# Patient Record
Sex: MALE | Race: WHITE | NOT HISPANIC OR LATINO | Employment: FULL TIME
[De-identification: names, ages, dates, MRNs, and addresses within clinical notes are randomized per-mention and may not be internally consistent; named-entity substitution may affect disease eponyms.]

---

## 2017-01-18 ENCOUNTER — RECORDS - HEALTHEAST (OUTPATIENT)
Dept: ADMINISTRATIVE | Facility: OTHER | Age: 52
End: 2017-01-18

## 2017-02-03 ENCOUNTER — COMMUNICATION - HEALTHEAST (OUTPATIENT)
Dept: INTERNAL MEDICINE | Facility: CLINIC | Age: 52
End: 2017-02-03

## 2017-03-21 ENCOUNTER — COMMUNICATION - HEALTHEAST (OUTPATIENT)
Dept: INTERNAL MEDICINE | Facility: CLINIC | Age: 52
End: 2017-03-21

## 2017-05-03 ENCOUNTER — RECORDS - HEALTHEAST (OUTPATIENT)
Dept: ADMINISTRATIVE | Facility: OTHER | Age: 52
End: 2017-05-03

## 2017-07-20 ENCOUNTER — COMMUNICATION - HEALTHEAST (OUTPATIENT)
Dept: INTERNAL MEDICINE | Facility: CLINIC | Age: 52
End: 2017-07-20

## 2017-08-02 ENCOUNTER — RECORDS - HEALTHEAST (OUTPATIENT)
Dept: ADMINISTRATIVE | Facility: OTHER | Age: 52
End: 2017-08-02

## 2018-02-09 ENCOUNTER — COMMUNICATION - HEALTHEAST (OUTPATIENT)
Dept: INTERNAL MEDICINE | Facility: CLINIC | Age: 53
End: 2018-02-09

## 2018-08-01 ENCOUNTER — COMMUNICATION - HEALTHEAST (OUTPATIENT)
Dept: INTERNAL MEDICINE | Facility: CLINIC | Age: 53
End: 2018-08-01

## 2018-08-31 ENCOUNTER — OFFICE VISIT - HEALTHEAST (OUTPATIENT)
Dept: INTERNAL MEDICINE | Facility: CLINIC | Age: 53
End: 2018-08-31

## 2018-08-31 DIAGNOSIS — L03.90 CELLULITIS: ICD-10-CM

## 2018-08-31 DIAGNOSIS — Z12.11 SCREEN FOR COLON CANCER: ICD-10-CM

## 2018-08-31 DIAGNOSIS — M06.9 RA (RHEUMATOID ARTHRITIS) (H): ICD-10-CM

## 2018-08-31 LAB
ALBUMIN SERPL-MCNC: 4.5 G/DL (ref 3.5–5)
ALBUMIN SERPL-MCNC: 4.5 G/DL (ref 3.5–5)
ALP SERPL-CCNC: 64 U/L (ref 45–120)
ALT SERPL W P-5'-P-CCNC: 33 U/L (ref 0–45)
ANION GAP SERPL CALCULATED.3IONS-SCNC: 11 MMOL/L (ref 5–18)
AST SERPL W P-5'-P-CCNC: 33 U/L (ref 0–40)
BASOPHILS # BLD AUTO: 0.1 THOU/UL (ref 0–0.2)
BASOPHILS NFR BLD AUTO: 1 % (ref 0–2)
BILIRUB DIRECT SERPL-MCNC: 0.3 MG/DL
BILIRUB SERPL-MCNC: 0.9 MG/DL (ref 0–1)
BUN SERPL-MCNC: 19 MG/DL (ref 8–22)
CALCIUM SERPL-MCNC: 9.9 MG/DL (ref 8.5–10.5)
CHLORIDE BLD-SCNC: 101 MMOL/L (ref 98–107)
CO2 SERPL-SCNC: 27 MMOL/L (ref 22–31)
CREAT SERPL-MCNC: 0.94 MG/DL (ref 0.7–1.3)
EOSINOPHIL # BLD AUTO: 0.3 THOU/UL (ref 0–0.4)
EOSINOPHIL NFR BLD AUTO: 3 % (ref 0–6)
ERYTHROCYTE [DISTWIDTH] IN BLOOD BY AUTOMATED COUNT: 11.3 % (ref 11–14.5)
ERYTHROCYTE [SEDIMENTATION RATE] IN BLOOD BY WESTERGREN METHOD: 2 MM/HR (ref 0–15)
GFR SERPL CREATININE-BSD FRML MDRD: >60 ML/MIN/1.73M2
GLUCOSE BLD-MCNC: 98 MG/DL (ref 70–125)
HCT VFR BLD AUTO: 44.2 % (ref 40–54)
HGB BLD-MCNC: 14.9 G/DL (ref 14–18)
LYMPHOCYTES # BLD AUTO: 1.3 THOU/UL (ref 0.8–4.4)
LYMPHOCYTES NFR BLD AUTO: 13 % (ref 20–40)
MCH RBC QN AUTO: 30.8 PG (ref 27–34)
MCHC RBC AUTO-ENTMCNC: 33.8 G/DL (ref 32–36)
MCV RBC AUTO: 91 FL (ref 80–100)
MONOCYTES # BLD AUTO: 0.9 THOU/UL (ref 0–0.9)
MONOCYTES NFR BLD AUTO: 9 % (ref 2–10)
NEUTROPHILS # BLD AUTO: 7.3 THOU/UL (ref 2–7.7)
NEUTROPHILS NFR BLD AUTO: 74 % (ref 50–70)
PHOSPHATE SERPL-MCNC: 3.1 MG/DL (ref 2.5–4.5)
PLATELET # BLD AUTO: 313 THOU/UL (ref 140–440)
PMV BLD AUTO: 7 FL (ref 7–10)
POTASSIUM BLD-SCNC: 4.9 MMOL/L (ref 3.5–5)
PROT SERPL-MCNC: 7.3 G/DL (ref 6–8)
RBC # BLD AUTO: 4.84 MILL/UL (ref 4.4–6.2)
SODIUM SERPL-SCNC: 139 MMOL/L (ref 136–145)
URATE SERPL-MCNC: 8.4 MG/DL (ref 3–8)
WBC: 9.8 THOU/UL (ref 4–11)

## 2018-08-31 ASSESSMENT — MIFFLIN-ST. JEOR: SCORE: 1665.3

## 2018-09-07 ENCOUNTER — COMMUNICATION - HEALTHEAST (OUTPATIENT)
Dept: INTERNAL MEDICINE | Facility: CLINIC | Age: 53
End: 2018-09-07

## 2019-01-05 ENCOUNTER — COMMUNICATION - HEALTHEAST (OUTPATIENT)
Dept: INTERNAL MEDICINE | Facility: CLINIC | Age: 54
End: 2019-01-05

## 2019-01-05 DIAGNOSIS — I10 HTN (HYPERTENSION): ICD-10-CM

## 2019-05-03 ENCOUNTER — RECORDS - HEALTHEAST (OUTPATIENT)
Dept: ADMINISTRATIVE | Facility: OTHER | Age: 54
End: 2019-05-03

## 2019-05-10 ENCOUNTER — OFFICE VISIT - HEALTHEAST (OUTPATIENT)
Dept: INTERNAL MEDICINE | Facility: CLINIC | Age: 54
End: 2019-05-10

## 2019-05-10 DIAGNOSIS — N50.3 EPIDIDYMAL CYST: ICD-10-CM

## 2019-05-10 DIAGNOSIS — Z00.00 PREVENTATIVE HEALTH CARE: ICD-10-CM

## 2019-05-10 DIAGNOSIS — M05.722 RHEUMATOID ARTHRITIS INVOLVING BOTH ELBOWS WITH POSITIVE RHEUMATOID FACTOR (H): ICD-10-CM

## 2019-05-10 DIAGNOSIS — I10 ESSENTIAL HYPERTENSION: ICD-10-CM

## 2019-05-10 DIAGNOSIS — M05.721 RHEUMATOID ARTHRITIS INVOLVING BOTH ELBOWS WITH POSITIVE RHEUMATOID FACTOR (H): ICD-10-CM

## 2019-05-10 LAB
ALBUMIN SERPL-MCNC: 4.6 G/DL (ref 3.5–5)
ALBUMIN UR-MCNC: NEGATIVE MG/DL
ALP SERPL-CCNC: 57 U/L (ref 45–120)
ALT SERPL W P-5'-P-CCNC: 28 U/L (ref 0–45)
ANION GAP SERPL CALCULATED.3IONS-SCNC: 12 MMOL/L (ref 5–18)
APPEARANCE UR: CLEAR
AST SERPL W P-5'-P-CCNC: 30 U/L (ref 0–40)
BASOPHILS # BLD AUTO: 0.1 THOU/UL (ref 0–0.2)
BASOPHILS NFR BLD AUTO: 1 % (ref 0–2)
BILIRUB DIRECT SERPL-MCNC: 0.4 MG/DL
BILIRUB SERPL-MCNC: 1 MG/DL (ref 0–1)
BILIRUB UR QL STRIP: NEGATIVE
BUN SERPL-MCNC: 13 MG/DL (ref 8–22)
C REACTIVE PROTEIN LHE: <0.1 MG/DL (ref 0–0.8)
CALCIUM SERPL-MCNC: 9 MG/DL (ref 8.5–10.5)
CHLORIDE BLD-SCNC: 100 MMOL/L (ref 98–107)
CHOLEST SERPL-MCNC: 204 MG/DL
CO2 SERPL-SCNC: 26 MMOL/L (ref 22–31)
COLOR UR AUTO: YELLOW
CREAT SERPL-MCNC: 0.92 MG/DL (ref 0.7–1.3)
EOSINOPHIL # BLD AUTO: 0.2 THOU/UL (ref 0–0.4)
EOSINOPHIL NFR BLD AUTO: 2 % (ref 0–6)
ERYTHROCYTE [DISTWIDTH] IN BLOOD BY AUTOMATED COUNT: 11.5 % (ref 11–14.5)
ERYTHROCYTE [SEDIMENTATION RATE] IN BLOOD BY WESTERGREN METHOD: 2 MM/HR (ref 0–15)
FASTING STATUS PATIENT QL REPORTED: YES
GFR SERPL CREATININE-BSD FRML MDRD: >60 ML/MIN/1.73M2
GLUCOSE BLD-MCNC: 95 MG/DL (ref 70–125)
GLUCOSE UR STRIP-MCNC: NEGATIVE MG/DL
HCT VFR BLD AUTO: 42.2 % (ref 40–54)
HDLC SERPL-MCNC: 97 MG/DL
HGB BLD-MCNC: 14.4 G/DL (ref 14–18)
HGB UR QL STRIP: NEGATIVE
KETONES UR STRIP-MCNC: NEGATIVE MG/DL
LDLC SERPL CALC-MCNC: 96 MG/DL
LEUKOCYTE ESTERASE UR QL STRIP: NEGATIVE
LYMPHOCYTES # BLD AUTO: 1.7 THOU/UL (ref 0.8–4.4)
LYMPHOCYTES NFR BLD AUTO: 21 % (ref 20–40)
MCH RBC QN AUTO: 31 PG (ref 27–34)
MCHC RBC AUTO-ENTMCNC: 34.2 G/DL (ref 32–36)
MCV RBC AUTO: 91 FL (ref 80–100)
MONOCYTES # BLD AUTO: 0.7 THOU/UL (ref 0–0.9)
MONOCYTES NFR BLD AUTO: 9 % (ref 2–10)
NEUTROPHILS # BLD AUTO: 5.5 THOU/UL (ref 2–7.7)
NEUTROPHILS NFR BLD AUTO: 68 % (ref 50–70)
NITRATE UR QL: NEGATIVE
PH UR STRIP: 5.5 [PH] (ref 5–8)
PLATELET # BLD AUTO: 328 THOU/UL (ref 140–440)
PMV BLD AUTO: 7 FL (ref 7–10)
POTASSIUM BLD-SCNC: 3.4 MMOL/L (ref 3.5–5)
PROT SERPL-MCNC: 7.1 G/DL (ref 6–8)
PSA SERPL-MCNC: 1.6 NG/ML (ref 0–3.5)
RBC # BLD AUTO: 4.65 MILL/UL (ref 4.4–6.2)
SODIUM SERPL-SCNC: 138 MMOL/L (ref 136–145)
SP GR UR STRIP: <=1.005 (ref 1–1.03)
TRIGL SERPL-MCNC: 55 MG/DL
TSH SERPL DL<=0.005 MIU/L-ACNC: 0.68 UIU/ML (ref 0.3–5)
UROBILINOGEN UR STRIP-ACNC: NORMAL
WBC: 8.2 THOU/UL (ref 4–11)

## 2019-05-10 ASSESSMENT — MIFFLIN-ST. JEOR: SCORE: 1684.01

## 2019-05-11 ENCOUNTER — COMMUNICATION - HEALTHEAST (OUTPATIENT)
Dept: INTERNAL MEDICINE | Facility: CLINIC | Age: 54
End: 2019-05-11

## 2019-05-15 LAB
ATRIAL RATE - MUSE: 63 BPM
DIASTOLIC BLOOD PRESSURE - MUSE: NORMAL MMHG
INTERPRETATION ECG - MUSE: NORMAL
P AXIS - MUSE: 67 DEGREES
PR INTERVAL - MUSE: 190 MS
QRS DURATION - MUSE: 106 MS
QT - MUSE: 406 MS
QTC - MUSE: 415 MS
R AXIS - MUSE: 43 DEGREES
SYSTOLIC BLOOD PRESSURE - MUSE: NORMAL MMHG
T AXIS - MUSE: 33 DEGREES
VENTRICULAR RATE- MUSE: 63 BPM

## 2019-05-30 ENCOUNTER — COMMUNICATION - HEALTHEAST (OUTPATIENT)
Dept: INTERNAL MEDICINE | Facility: CLINIC | Age: 54
End: 2019-05-30

## 2019-05-30 ENCOUNTER — HOSPITAL ENCOUNTER (OUTPATIENT)
Dept: ULTRASOUND IMAGING | Facility: HOSPITAL | Age: 54
Discharge: HOME | End: 2019-05-30
Attending: INTERNAL MEDICINE

## 2019-05-30 DIAGNOSIS — N50.3 EPIDIDYMAL CYST: ICD-10-CM

## 2019-06-06 ENCOUNTER — COMMUNICATION - HEALTHEAST (OUTPATIENT)
Dept: INTERNAL MEDICINE | Facility: CLINIC | Age: 54
End: 2019-06-06

## 2019-10-08 ENCOUNTER — COMMUNICATION - HEALTHEAST (OUTPATIENT)
Dept: INTERNAL MEDICINE | Facility: CLINIC | Age: 54
End: 2019-10-08

## 2019-10-08 DIAGNOSIS — I10 HTN (HYPERTENSION): ICD-10-CM

## 2020-04-09 ENCOUNTER — COMMUNICATION - HEALTHEAST (OUTPATIENT)
Dept: INTERNAL MEDICINE | Facility: CLINIC | Age: 55
End: 2020-04-09

## 2020-04-09 DIAGNOSIS — I10 HTN (HYPERTENSION): ICD-10-CM

## 2020-07-09 ENCOUNTER — COMMUNICATION - HEALTHEAST (OUTPATIENT)
Dept: INTERNAL MEDICINE | Facility: CLINIC | Age: 55
End: 2020-07-09

## 2020-07-09 DIAGNOSIS — I10 HTN (HYPERTENSION): ICD-10-CM

## 2020-10-05 ENCOUNTER — COMMUNICATION - HEALTHEAST (OUTPATIENT)
Dept: INTERNAL MEDICINE | Facility: CLINIC | Age: 55
End: 2020-10-05

## 2020-10-05 DIAGNOSIS — I10 HTN (HYPERTENSION): ICD-10-CM

## 2021-01-02 ENCOUNTER — COMMUNICATION - HEALTHEAST (OUTPATIENT)
Dept: INTERNAL MEDICINE | Facility: CLINIC | Age: 56
End: 2021-01-02

## 2021-01-02 DIAGNOSIS — I10 HTN (HYPERTENSION): ICD-10-CM

## 2021-03-04 ENCOUNTER — RECORDS - HEALTHEAST (OUTPATIENT)
Dept: ADMINISTRATIVE | Facility: OTHER | Age: 56
End: 2021-03-04

## 2021-03-30 ENCOUNTER — COMMUNICATION - HEALTHEAST (OUTPATIENT)
Dept: INTERNAL MEDICINE | Facility: CLINIC | Age: 56
End: 2021-03-30

## 2021-03-30 DIAGNOSIS — I10 HTN (HYPERTENSION): ICD-10-CM

## 2021-03-30 RX ORDER — HYDROXYCHLOROQUINE SULFATE 200 MG/1
200 TABLET, FILM COATED ORAL
Status: SHIPPED | COMMUNITY
Start: 2021-03-04 | End: 2022-03-04

## 2021-04-27 ENCOUNTER — COMMUNICATION - HEALTHEAST (OUTPATIENT)
Dept: INTERNAL MEDICINE | Facility: CLINIC | Age: 56
End: 2021-04-27

## 2021-04-27 DIAGNOSIS — I10 HTN (HYPERTENSION): ICD-10-CM

## 2021-05-13 ENCOUNTER — COMMUNICATION - HEALTHEAST (OUTPATIENT)
Dept: INTERNAL MEDICINE | Facility: CLINIC | Age: 56
End: 2021-05-13

## 2021-05-28 NOTE — PROGRESS NOTES
HISTORY/PHYSICAL EXAM  Mao Larson   53 y.o. male  Is here for a physical healthcare maintenance examination        Assessment/Plan for  Mao Larson is a 53 y.o. male.       1.  Healthcare maintenance examination-healthy man  2.  Rheumatoid arthritis-off Plaquenil per rheumatology.  No evidence of recurrence.  I checked a sed rate CRP  He needs to see an eye physician.  He has not done that in years  3.  Right epididymal cyst clinically.  Confirm with ultrasound  4.  Hypertension excellent control.  Check routine ECG-done without evidence of hypertrophy      Plan:  1.  Routine lab  2.  ECG  3.  I appointment  4.  Same medication  5.  Annual exam  6.  Ultrasound right testicle/cyst        There are no Patient Instructions on file for this visit.    Diagnoses and all orders for this visit:    Preventative health care  -     1(CBC and Differential)  -     Cancel: Erythrocyte Sedimentation Rate  -     Urinalysis-UC if Indicated  -     Basic Metabolic Panel  -     Hepatic Profile  -     Lipid Cascade  -     Thyroid Gregory  -     PSA (Prostatic-Specific Antigen), Annual Screen  -     [facility] (CBC with Diff)    Rheumatoid arthritis involving both elbows with positive rheumatoid factor (H)  -     C-Reactive Protein  -     Erythrocyte Sedimentation Rate    Essential hypertension  -     Electrocardiogram Perform - Clinic    Epididymal cyst  -     US Scrotum and Testicles W Duplex Ltd; Future; Expected date: 05/10/2019            Medications:  Medications after visit  Current Outpatient Medications   Medication Sig Dispense Refill     lisinopril-hydrochlorothiazide (PRINZIDE,ZESTORETIC) 20-12.5 mg per tablet TAKE ONE TABLET BY MOUTH ONE TIME DAILY 90 tablet 2     No current facility-administered medications for this visit.               Abraham Corrales MD  Internal medicine  [facility]  [phone]  [email or web address]      This is an electronically verified report by Abraham  Savanna AZEVEDO  (Note created with Dragon voice recognition and unintended spelling errors and word substitutions may occur)        SUBJECTIVE/HPI    Chief Complaint:  Annual Exam (Fasting)   Here for annual exam  He is a healthy man  He has no complaints except he has noticed a lesion of his right testicle on self testicle examination.  It is not hurting him.      Patient is off Plaquenil  Weaned off over the last several months  No flare in any joints  No stiffness achiness  He is scheduled to revisit with rheumatology.  Patient never had any eye examination      Hypertension-There are no cardiovascular, respiratory, neurologic complaints.No claudication.There is no orthostasis.Patient is compliant with medications.  Medications reviewed.   No side effects from medication.          Review of Systems:   Extensive 14-point comprehensive review of systems was performed.   Patient reports  1.  Urination is okay no hematuria  Normal sexual function    Otherwise, the following systems are negative including constitutional, eyes, ears, nose and throat, cardiovascular, respiratory, gastrointestinal, genitourinary, musculoskeletal,neurological, skin and/or breast, endocrine, hematologic/lymph, allergic/immunologic and psychiatric.  Surgical History  Past Surgical History:   Procedure Laterality Date     INGUINAL HERNIA REPAIR Left      KNEE ARTHROSCOPY Left      KNEE ARTHROSCOPY Left 12/2013    Repeat surgery     TONSILLECTOMY       UMBILICAL HERNIA REPAIR         Medical History     Past Medical History:   Diagnosis Date     IVCD (intraventricular conduction defect)      Mumps orchitis      Rheumatoid arthritis (H) 01/2014     Tremor      Patient Active Problem List    Diagnosis Date Noted     Rheumatoid arthritis (H) 09/23/2014        Family History  Family History   Problem Relation Age of Onset     Colon cancer Mother      Heart disease Father         Ischemic     Hypertension Father      Hyperlipidemia Father      Colon  polyps Sister              Medications:  Current Outpatient Medications on File Prior to Visit   Medication Sig     lisinopril-hydrochlorothiazide (PRINZIDE,ZESTORETIC) 20-12.5 mg per tablet TAKE ONE TABLET BY MOUTH ONE TIME DAILY     [DISCONTINUED] hydroxychloroquine sulfate (HYDROXYCHLOROQUINE ORAL) Take 200 mg by mouth 2 (two) times a day.     No current facility-administered medications on file prior to visit.           Allergies:  No Known Allergies    PSFHx:   Social History     Socioeconomic History     Marital status:      Spouse name: Not on file     Number of children: Not on file     Years of education: Not on file     Highest education level: Not on file   Occupational History     Not on file   Social Needs     Financial resource strain: Not on file     Food insecurity:     Worry: Not on file     Inability: Not on file     Transportation needs:     Medical: Not on file     Non-medical: Not on file   Tobacco Use     Smoking status: Never Smoker     Smokeless tobacco: Never Used   Substance and Sexual Activity     Alcohol use: Not on file     Drug use: Not on file     Sexual activity: Not on file   Lifestyle     Physical activity:     Days per week: Not on file     Minutes per session: Not on file     Stress: Not on file   Relationships     Social connections:     Talks on phone: Not on file     Gets together: Not on file     Attends Taoism service: Not on file     Active member of club or organization: Not on file     Attends meetings of clubs or organizations: Not on file     Relationship status: Not on file     Intimate partner violence:     Fear of current or ex partner: Not on file     Emotionally abused: Not on file     Physically abused: Not on file     Forced sexual activity: Not on file   Other Topics Concern     Not on file   Social History Narrative    WIFE SEMAJ 1988    PRINTER-12 HOUR [employer]. STARTS AT 5:30    3 NITIN    -ZOHAIB [employer]    -DINESH 1990               Objective:    /82   Pulse 72   Ht 6' (1.829 m)   Wt 181 lb (82.1 kg)   SpO2 98%   BMI 24.55 kg/m     Weight:   Wt Readings from Last 3 Encounters:   05/10/19 181 lb (82.1 kg)   08/31/18 176 lb (79.8 kg)   08/30/16 201 lb (91.2 kg)     BP Readings from Last 3 Encounters:   05/10/19 128/82   08/31/18 140/88   08/30/16 128/70     A pleasant well muscled man    General-appears well, no acute distress.  Skin: Normal. No rash or lesion  Lymph Nodes: None palpable-including neck, axilla, inguinal, epitrochlear.  Head:  Normocephalic.    Eyes: Midline.  Equal size., full ROM.  External exams normal.  No icterus  Ears:  Normal pinnae, canals, and TM's.    Nose:  Patent, without deformity.    Throat:  Moist mucous membranes without lesions, erythema, or exudate.    Neck: No palpable masses, lymphadenopathy or tenderness.No thyromegaly or goiter.  No thyroid nodule.  Carotid Arteries:  No Bruit.  Carotid upstroke normal  Chest Wall: No deformity or pain elicited on compression.  Respiratory:  Normal respiratory effort.  Lungs are clear with good breath sounds.  No dullness.  No wheezing.  Heart: Regular rhythm.  Normal sounding S1, S2 without S3, S4, murmurs, rubs, or gallops.    Abdomen:  The abdomen was flat, soft and nontender without guarding rebound or masses.  There are normal bowel sounds.  There is no hepatosplenomegaly.  There is no palpable enlargement of the aorta.  Genitalia:  Circumcised male.  No hernia.  Left testicle small compared to right  Right epididymis enlarged consistent with a cyst.  Nontender.  Rectal/Prostate:  No external lesions.  Sphincter tone normal.  No palpable rectal lesions.    Prostate 1/4 BPH without nodule, smooth, nontender without palpable lesions.    Extremities:  Full ROM without limitation, deformity or edema.    4+ pulses  Neurologic-intact- No focal deficit.  Speech clear.  Coordination normal.  Strength symmetric  Orthopedic-no arthropathy.  There is no joint  synovitis        Laboratory:    Recent Results (from the past 24 hour(s))   Urinalysis-UC if Indicated   Result Value Ref Range    Color, UA Yellow Colorless, Yellow, Straw, Light Yellow    Clarity, UA Clear Clear    Glucose, UA Negative Negative    Bilirubin, UA Negative Negative    Ketones, UA Negative Negative    Specific Gravity, UA <=1.005 1.005 - 1.030    Blood, UA Negative Negative    pH, UA 5.5 5.0 - 8.0    Protein, UA Negative Negative mg/dL    Urobilinogen, UA 0.2 E.U./dL 0.2 E.U./dL, 1.0 E.U./dL    Nitrite, UA Negative Negative    Leukocytes, UA Negative Negative   HM1 (CBC with Diff)   Result Value Ref Range    WBC 8.2 4.0 - 11.0 thou/uL    RBC 4.65 4.40 - 6.20 mill/uL    Hemoglobin 14.4 14.0 - 18.0 g/dL    Hematocrit 42.2 40.0 - 54.0 %    MCV 91 80 - 100 fL    MCH 31.0 27.0 - 34.0 pg    MCHC 34.2 32.0 - 36.0 g/dL    RDW 11.5 11.0 - 14.5 %    Platelets 328 140 - 440 thou/uL    MPV 7.0 7.0 - 10.0 fL    Neutrophils % 68 50 - 70 %    Lymphocytes % 21 20 - 40 %    Monocytes % 9 2 - 10 %    Eosinophils % 2 0 - 6 %    Basophils % 1 0 - 2 %    Neutrophils Absolute 5.5 2.0 - 7.7 thou/uL    Lymphocytes Absolute 1.7 0.8 - 4.4 thou/uL    Monocytes Absolute 0.7 0.0 - 0.9 thou/uL    Eosinophils Absolute 0.2 0.0 - 0.4 thou/uL    Basophils Absolute 0.1 0.0 - 0.2 thou/uL   Erythrocyte Sedimentation Rate   Result Value Ref Range    Sed Rate 2 0 - 15 mm/hr   Electrocardiogram Perform - Clinic   Result Value Ref Range    SYSTOLIC BLOOD PRESSURE  mmHg    DIASTOLIC BLOOD PRESSURE  mmHg    VENTRICULAR RATE 63 BPM    ATRIAL RATE 63 BPM    P-R INTERVAL 190 ms    QRS DURATION 106 ms    Q-T INTERVAL 406 ms    QTC CALCULATION (BEZET) 415 ms    P Axis 67 degrees    R AXIS 43 degrees    T AXIS 33 degrees    MUSE DIAGNOSIS       Normal sinus rhythm  Normal ECG  When compared with ECG of 01-JUN-2016 15:33,  Nonspecific T wave abnormality no longer evident in Anterior leads        Reviewed Rheum note        Patient  Instructions  Raymundo Lamar MD - 10/03/2018 1:30 PM CDT    With the sustained remission, ok to try to start tapering now the plaquenil    Generally I go by reducing by 1/2 tab - every 3-6 months    -- so go to 1 1/2 tabs (300mg) daily for 3 months (January 1st) - and if no new symptoms, then go to 1 tab daily for 3 months, then see me back in clinic     If any new flaring occurs, just back to the 2 tabs daily (or 1 tab twice a day)    Then see me back in [address] [place] [address]    Electronically signed by Raymundo Lamar MD at 10/03/2018 2:09 PM CDT

## 2021-05-29 NOTE — TELEPHONE ENCOUNTER
Test Results  Who is calling?:  Patient  Who ordered the test:  Abraham Corrales MD  Type of test: Imaging  Date of test:  US of scrotum  Where was the test performed:  [facility]  What are your questions/concerns?: Pt advised of below message and letter mailed to his home.   Okay to leave a detailed message?:  Yes              Fletcher  ,   your Testicle lesion is a benign cyst. Nothing significant     Please call with questions or contact us using [employer].     Sincerely,

## 2021-06-01 ENCOUNTER — COMMUNICATION - HEALTHEAST (OUTPATIENT)
Dept: INTERNAL MEDICINE | Facility: CLINIC | Age: 56
End: 2021-06-01

## 2021-06-01 VITALS — HEIGHT: 72 IN | BODY MASS INDEX: 23.84 KG/M2 | WEIGHT: 176 LBS

## 2021-06-01 DIAGNOSIS — I10 HTN (HYPERTENSION): ICD-10-CM

## 2021-06-02 VITALS — WEIGHT: 181 LBS | HEIGHT: 72 IN | BODY MASS INDEX: 24.52 KG/M2

## 2021-06-02 NOTE — TELEPHONE ENCOUNTER
Refill Approved    Rx renewed per Medication Renewal Policy. Medication was last renewed on 1/6/19.    Leidy Alejandre, [facility] Triage/Med Refill 10/9/2019     Requested Prescriptions   Pending Prescriptions Disp Refills     lisinopril-hydrochlorothiazide (PRINZIDE,ZESTORETIC) 20-12.5 mg per tablet 90 tablet 2     Sig: Take 1 tablet by mouth daily.       Diuretics/Combination Diuretics Refill Protocol  Passed - 10/8/2019  5:06 PM        Passed - Visit with PCP or prescribing provider visit in past 12 months     Last office visit with prescriber/PCP: 8/31/2018 Abraham Corrales MD OR same dept: Visit date not found OR same specialty: 8/31/2018 Abraham Corrales MD  Last physical: 5/10/2019 Last MTM visit: Visit date not found   Next visit within 3 mo: Visit date not found  Next physical within 3 mo: Visit date not found  Prescriber OR PCP: Abraham Corrales MD  Last diagnosis associated with med order: 1. HTN (hypertension)  - lisinopril-hydrochlorothiazide (PRINZIDE,ZESTORETIC) 20-12.5 mg per tablet; Take 1 tablet by mouth daily.  Dispense: 90 tablet; Refill: 2    If protocol passes may refill for 12 months if within 3 months of last provider visit (or a total of 15 months).             Passed - Serum Potassium in past 12 months      Lab Results   Component Value Date    Potassium 3.4 (L) 05/10/2019             Passed - Serum Sodium in past 12 months      Lab Results   Component Value Date    Sodium 138 05/10/2019             Passed - Blood pressure on file in past 12 months     BP Readings from Last 1 Encounters:   05/10/19 128/82             Passed - Serum Creatinine in past 12 months      Creatinine   Date Value Ref Range Status   05/10/2019 0.92 0.70 - 1.30 mg/dL Final

## 2021-06-05 ENCOUNTER — COMMUNICATION - HEALTHEAST (OUTPATIENT)
Dept: INTERNAL MEDICINE | Facility: CLINIC | Age: 56
End: 2021-06-05

## 2021-06-05 DIAGNOSIS — I10 HTN (HYPERTENSION): ICD-10-CM

## 2021-06-07 ENCOUNTER — OFFICE VISIT - HEALTHEAST (OUTPATIENT)
Dept: INTERNAL MEDICINE | Facility: CLINIC | Age: 56
End: 2021-06-07

## 2021-06-07 DIAGNOSIS — I10 HYPERTENSION, UNSPECIFIED TYPE: ICD-10-CM

## 2021-06-07 DIAGNOSIS — M06.9 RHEUMATOID ARTHRITIS INVOLVING MULTIPLE SITES, UNSPECIFIED WHETHER RHEUMATOID FACTOR PRESENT (H): ICD-10-CM

## 2021-06-07 RX ORDER — LISINOPRIL AND HYDROCHLOROTHIAZIDE 12.5; 2 MG/1; MG/1
TABLET ORAL
Qty: 90 TABLET | Refills: 3 | Status: SHIPPED | OUTPATIENT
Start: 2021-06-07 | End: 2021-07-14

## 2021-06-07 ASSESSMENT — MIFFLIN-ST. JEOR: SCORE: 1717.57

## 2021-06-07 NOTE — TELEPHONE ENCOUNTER
Refill Approved    Rx renewed per Medication Renewal Policy. Medication was last renewed on 10/9/19.    Sol Trent, [facility] Triage/Med Refill 4/9/2020     Requested Prescriptions   Pending Prescriptions Disp Refills     lisinopriL-hydrochlorothiazide (PRINZIDE,ZESTORETIC) 20-12.5 mg per tablet [Pharmacy Med Name: Lisinopril-hydroCHLOROthiazide Oral Tablet 20-12.5 MG] 30 tablet 0     Sig: TAKE ONE TABLET BY MOUTH ONCE DAILY       Diuretics/Combination Diuretics Refill Protocol  Passed - 4/9/2020  4:51 AM        Passed - Visit with PCP or prescribing provider visit in past 12 months     Last office visit with prescriber/PCP: 8/31/2018 Abraham Corrales MD OR same dept: Visit date not found OR same specialty: 8/31/2018 Abraham Corrales MD  Last physical: 5/10/2019 Last MTM visit: Visit date not found   Next visit within 3 mo: Visit date not found  Next physical within 3 mo: Visit date not found  Prescriber OR PCP: Abraham Corrales MD  Last diagnosis associated with med order: 1. HTN (hypertension)  - lisinopriL-hydrochlorothiazide (PRINZIDE,ZESTORETIC) 20-12.5 mg per tablet [Pharmacy Med Name: Lisinopril-hydroCHLOROthiazide Oral Tablet 20-12.5 MG]; TAKE ONE TABLET BY MOUTH ONCE DAILY   Dispense: 30 tablet; Refill: 0    If protocol passes may refill for 12 months if within 3 months of last provider visit (or a total of 15 months).             Passed - Serum Potassium in past 12 months      Lab Results   Component Value Date    Potassium 3.4 (L) 05/10/2019             Passed - Serum Sodium in past 12 months      Lab Results   Component Value Date    Sodium 138 05/10/2019             Passed - Blood pressure on file in past 12 months     BP Readings from Last 1 Encounters:   05/10/19 128/82             Passed - Serum Creatinine in past 12 months      Creatinine   Date Value Ref Range Status   05/10/2019 0.92 0.70 - 1.30 mg/dL Final

## 2021-06-12 NOTE — TELEPHONE ENCOUNTER
RN cannot approve Refill Request    RN can NOT refill this medication PCP messaged that patient is overdue for Labs and Office Visit and Protocol failed and NO refill given. Last office visit: Visit date not found Last Physical: Visit date not found Last MTM visit: Visit date not found Last visit same specialty: 8/31/2018 Abraham Corrales MD.  Next visit within 3 mo: Visit date not found  Next physical within 3 mo: Visit date not found.   No upcoming appointment on file.       Eugenia Min, [facility] Triage/Med Refill 10/7/2020    Requested Prescriptions   Pending Prescriptions Disp Refills     lisinopriL-hydrochlorothiazide (PRINZIDE,ZESTORETIC) 20-12.5 mg per tablet [Pharmacy Med Name: Lisinopril-hydroCHLOROthiazide Oral Tablet 20-12.5 MG] 90 tablet 0     Sig: TAKE 1 TABLET BY MOUTH ONCE DAILY       Diuretics/Combination Diuretics Refill Protocol  Failed - 10/5/2020  2:00 AM        Failed - Visit with PCP or prescribing provider visit in past 12 months     Last office visit with prescriber/PCP: Visit date not found OR same dept: Visit date not found OR same specialty: 8/31/2018 Abraham Corrales MD  Last physical: Visit date not found Last MTM visit: Visit date not found   Next visit within 3 mo: Visit date not found  Next physical within 3 mo: Visit date not found  Prescriber OR PCP: Candido Horan MD  Last diagnosis associated with med order: 1. HTN (hypertension)  - lisinopriL-hydrochlorothiazide (PRINZIDE,ZESTORETIC) 20-12.5 mg per tablet [Pharmacy Med Name: Lisinopril-hydroCHLOROthiazide Oral Tablet 20-12.5 MG]; TAKE 1 TABLET BY MOUTH ONCE DAILY   Dispense: 90 tablet; Refill: 0    If protocol passes may refill for 12 months if within 3 months of last provider visit (or a total of 15 months).             Failed - Serum Potassium in past 12 months      No results found for: LN-POTASSIUM          Failed - Serum Sodium in past 12 months      No results found for: LN-SODIUM          Failed - Blood  pressure on file in past 12 months     BP Readings from Last 1 Encounters:   05/10/19 128/82             Failed - Serum Creatinine in past 12 months      Creatinine   Date Value Ref Range Status   05/10/2019 0.92 0.70 - 1.30 mg/dL Final

## 2021-06-14 NOTE — TELEPHONE ENCOUNTER
RN cannot approve Refill Request    RN can NOT refill this medication No established PCP. Last office visit: Visit date not found Last Physical: Visit date not found Last MTM visit: Visit date not found Last visit same specialty: 8/31/2018 Abraham Corrales MD.  Next visit within 3 mo: Visit date not found  Next physical within 3 mo: Visit date not found      Kendal Smith, [facility] Triage/Med Refill 1/2/2021    Requested Prescriptions   Pending Prescriptions Disp Refills     lisinopriL-hydrochlorothiazide (PRINZIDE,ZESTORETIC) 20-12.5 mg per tablet [Pharmacy Med Name: Lisinopril-hydroCHLOROthiazide Oral Tablet 20-12.5 MG] 90 tablet 0     Sig: TAKE 1 TABLET BY MOUTH ONCE DAILY       Diuretics/Combination Diuretics Refill Protocol  Failed - 1/2/2021  2:00 AM        Failed - Visit with PCP or prescribing provider visit in past 12 months     Last office visit with prescriber/PCP: Visit date not found OR same dept: Visit date not found OR same specialty: 8/31/2018 Abraham Corrales MD  Last physical: Visit date not found Last MTM visit: Visit date not found   Next visit within 3 mo: Visit date not found  Next physical within 3 mo: Visit date not found  Prescriber OR PCP: Candido Horan MD  Last diagnosis associated with med order: 1. HTN (hypertension)  - lisinopriL-hydrochlorothiazide (PRINZIDE,ZESTORETIC) 20-12.5 mg per tablet [Pharmacy Med Name: Lisinopril-hydroCHLOROthiazide Oral Tablet 20-12.5 MG]; TAKE 1 TABLET BY MOUTH ONCE DAILY   Dispense: 90 tablet; Refill: 0    If protocol passes may refill for 12 months if within 3 months of last provider visit (or a total of 15 months).             Failed - Serum Potassium in past 12 months      No results found for: LN-POTASSIUM          Failed - Serum Sodium in past 12 months      No results found for: LN-SODIUM          Failed - Blood pressure on file in past 12 months     BP Readings from Last 1 Encounters:   05/10/19 128/82             Failed - Serum  Creatinine in past 12 months      Creatinine   Date Value Ref Range Status   05/10/2019 0.92 0.70 - 1.30 mg/dL Final

## 2021-06-16 NOTE — TELEPHONE ENCOUNTER
RN cannot approve Refill Request    RN can NOT refill this medication PCP messaged that patient is overdue for Labs and Office Visit. Last office visit: Visit date not found Last Physical: Visit date not found Last MTM visit: Visit date not found Last visit same specialty: 8/31/2018 Abraham Corrales MD.  Next visit within 3 mo: Visit date not found  Next physical within 3 mo: Visit date not found      Omaira Morales, [facility] Triage/Med Refill 3/30/2021    Requested Prescriptions   Pending Prescriptions Disp Refills     lisinopriL-hydrochlorothiazide (PRINZIDE,ZESTORETIC) 20-12.5 mg per tablet [Pharmacy Med Name: Lisinopril-hydroCHLOROthiazide Oral Tablet 20-12.5 MG] 90 tablet 0     Sig: TAKE 1 TABLET BY MOUTH ONCE DAILY       Diuretics/Combination Diuretics Refill Protocol  Failed - 3/30/2021  2:00 AM        Failed - Visit with PCP or prescribing provider visit in past 12 months     Last office visit with prescriber/PCP: Visit date not found OR same dept: Visit date not found OR same specialty: 8/31/2018 Abraham Corrales MD  Last physical: Visit date not found Last MTM visit: Visit date not found   Next visit within 3 mo: Visit date not found  Next physical within 3 mo: Visit date not found  Prescriber OR PCP: Jose Castrejon MD  Last diagnosis associated with med order: 1. HTN (hypertension)  - lisinopriL-hydrochlorothiazide (PRINZIDE,ZESTORETIC) 20-12.5 mg per tablet [Pharmacy Med Name: Lisinopril-hydroCHLOROthiazide Oral Tablet 20-12.5 MG]; TAKE 1 TABLET BY MOUTH ONCE DAILY   Dispense: 90 tablet; Refill: 0    If protocol passes may refill for 12 months if within 3 months of last provider visit (or a total of 15 months).             Failed - Serum Potassium in past 12 months      No results found for: LN-POTASSIUM          Failed - Serum Sodium in past 12 months      No results found for: LN-SODIUM          Failed - Blood pressure on file in past 12 months     BP Readings from Last 1 Encounters:    05/10/19 128/82             Failed - Serum Creatinine in past 12 months      Creatinine   Date Value Ref Range Status   05/10/2019 0.92 0.70 - 1.30 mg/dL Final

## 2021-06-16 NOTE — TELEPHONE ENCOUNTER
Called and left detailed message for patient and provided call back number to call back if he would like to est care with Dr. Sánchez

## 2021-06-16 NOTE — TELEPHONE ENCOUNTER
Please call patient -    ______________________________________________________________________     Home phone:  [phone] (home)     Cell phone:   Telephone Information:   Mobile [phone]       Other contacts:  Name Home Phone Work Phone Mobile Phone Relationship Lgl SEMAJ Bettencourt   [phone] Spouse    NONE, PER PT    Declined      ______________________________________________________________________     I did a refill for 30 days of his lisinopril/hydrochlorothiazide.    Dr. Corrales has retired.    I could  the patient as a new patient or he could establish care with another colleague if he wishes.    Osmel Sánchez MD  [facility]  3/30/2021, 11:04 PM

## 2021-06-17 NOTE — TELEPHONE ENCOUNTER
Former patient of ??? & has not established care with another provider.  Please assign refill request to covering provider per Clinic standard process.      RN cannot approve Refill Request    RN can NOT refill this medication Protocol failed and NO refill given and no pcp. Last office visit: Visit date not found Last Physical: Visit date not found Last MTM visit: Visit date not found Last visit same specialty: 8/31/2018 Abraham Corrales MD.  Next visit within 3 mo: Visit date not found  Next physical within 3 mo: Visit date not found      Pierre Russell, [facility] Connection Triage/Med Refill 4/27/2021    Requested Prescriptions   Pending Prescriptions Disp Refills     lisinopriL-hydrochlorothiazide (PRINZIDE,ZESTORETIC) 20-12.5 mg per tablet [Pharmacy Med Name: Lisinopril-hydroCHLOROthiazide Oral Tablet 20-12.5 MG] 30 tablet 0     Sig: TAKE 1 TABLET BY MOUTH ONCE DAILY       Diuretics/Combination Diuretics Refill Protocol  Failed - 4/27/2021  2:00 AM        Failed - Visit with PCP or prescribing provider visit in past 12 months     Last office visit with prescriber/PCP: Visit date not found OR same dept: Visit date not found OR same specialty: 8/31/2018 Abraham Corrales MD  Last physical: Visit date not found Last MTM visit: Visit date not found   Next visit within 3 mo: Visit date not found  Next physical within 3 mo: Visit date not found  Prescriber OR PCP: Osmel Sánchez MD  Last diagnosis associated with med order: 1. HTN (hypertension)  - lisinopriL-hydrochlorothiazide (PRINZIDE,ZESTORETIC) 20-12.5 mg per tablet [Pharmacy Med Name: Lisinopril-hydroCHLOROthiazide Oral Tablet 20-12.5 MG]; TAKE 1 TABLET BY MOUTH ONCE DAILY   Dispense: 30 tablet; Refill: 0    If protocol passes may refill for 12 months if within 3 months of last provider visit (or a total of 15 months).             Failed - Serum Potassium in past 12 months      No results found for: LN-POTASSIUM          Failed - Serum Sodium in past 12 months       No results found for: LN-SODIUM          Failed - Blood pressure on file in past 12 months     BP Readings from Last 1 Encounters:   05/10/19 128/82             Failed - Serum Creatinine in past 12 months      Creatinine   Date Value Ref Range Status   05/10/2019 0.92 0.70 - 1.30 mg/dL Final

## 2021-06-17 NOTE — TELEPHONE ENCOUNTER
That would be fine.  Please schedule for a 40-minute establish care visit.  It will probably have to be next month as I am only here next week and I am pretty booked up.

## 2021-06-17 NOTE — TELEPHONE ENCOUNTER
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)  -     C-Reactive Protein  -     Erythrocyte Sedimentation Rate     Essential hypertension  -     Electrocardiogram Perform - Clinic     Epididymal cyst  -     US Scrotum and Testicles W Duplex Ltd; Future; Expected date: 05/10/2019       Last Filled:  lisinopriL-hydrochlorothiazide (PRINZIDE,ZESTORETIC) 20-12.5 mg per tablet 30 tablet 0 3/30/2021  No   Sig: TAKE 1 TABLET BY MOUTH ONCE DAILY    Sent to pharmacy as: lisinopril 20 mg-hydrochlorothiazide 12.5 mg tablet (PRINZIDE,ZESTORETIC)   E-Prescribing Status: Receipt confirmed by pharmacy (3/30/2021 11:04 PM CDT)       Next OV:  Visit date not found        Medication teed up for provider signature

## 2021-06-17 NOTE — TELEPHONE ENCOUNTER
Pt is former patient of Dr. Corrales. He had to get a refill for his PB medication and was told he must establish care with a new provider.    His father was a Dr. Corrales patient and his Mom before she passed away from cancer.   His father now is a patient of Dr. Rodriguez and is wondering if there is any way he could become a patient with Dr. Rodriguez?    Ideally, he would like to have Dr. Rodriguez become the provider for his son's Sherwin and Manuel.  He feels there is great value for the family to have the doctor who knows the family history.    The best number to reach Mao is .

## 2021-06-19 NOTE — LETTER
Letter by Abraham Corrales MD at      Author: Abraham Corrales MD Service: -- Author Type: --    Filed:  Encounter Date: 5/30/2019 Status: (Other)         Mao Larson  [id number] [facility] [address]             May 30, 2019         Dear Mr. Larson,    Below are the results from your recent visit:    Resulted Orders   US Scrotum and Testicles W Duplex Ltd    Narrative    EXAM: US SCROTUM AND TESTICLES WITH DUPLEX LIMITED  LOCATION: [facility]  DATE/TIME: 5/30/2019 6:47 PM    INDICATION: Cyst of epididymis  COMPARISON: None.  TECHNIQUE: Ultrasound of scrotum with duplex utilizing 2D gray-scale imaging, Doppler interrogation with color-flow and spectral waveform analysis.    FINDINGS:  RIGHT: Right testicle measures 4.2 x 3.1 x 2.6 cm. Normal testicle with no masses. Normal arterial duplex and normal color flow. 2.5 x 1.6 cm simple epididymal head cyst. No hydrocele.    LEFT: Left testicle measures 2.8 x 2 x 1.4 cm. Normal testicle with no masses. Normal arterial duplex and normal color flow. Normal epididymis. No hydrocele. There is a mildly prominent left varicocele present.      Impression    CONCLUSION:  1.  Benign 2.5 x 1.6 cm right epididymal head cyst.  2.  Modestly atrophic left testicle.  3.  Left sided varicocele.       [place]     Testicle lesion is a benign cyst. Nothing significant    Please call with questions or contact us using [employer].    Sincerely,        Electronically signed by Abraham Corrales MD

## 2021-06-19 NOTE — LETTER
Letter by Abraham Corrales MD at      Author: Abraham Corrales MD Service: -- Author Type: --    Filed:  Encounter Date: 5/11/2019 Status: (Other)         Mao Larson  [id number] [facility] [address]             May 11, 2019         Dear Mr. Larson,    Below are the results from your recent visit:    Resulted Orders   Urinalysis-UC if Indicated   Result Value Ref Range    Color, UA Yellow Colorless, Yellow, Straw, Light Yellow    Clarity, UA Clear Clear    Glucose, UA Negative Negative    Bilirubin, UA Negative Negative    Ketones, UA Negative Negative    Specific Gravity, UA <=1.005 1.005 - 1.030    Blood, UA Negative Negative    pH, UA 5.5 5.0 - 8.0    Protein, UA Negative Negative mg/dL    Urobilinogen, UA 0.2 E.U./dL 0.2 E.U./dL, 1.0 E.U./dL    Nitrite, UA Negative Negative    Leukocytes, UA Negative Negative    Narrative    Microscopic not indicated  UC not indicated   Basic Metabolic Panel   Result Value Ref Range    Sodium 138 136 - 145 mmol/L    Potassium 3.4 (L) 3.5 - 5.0 mmol/L    Chloride 100 98 - 107 mmol/L    CO2 26 22 - 31 mmol/L    Anion Gap, Calculation 12 5 - 18 mmol/L    Glucose 95 70 - 125 mg/dL    Calcium 9.0 8.5 - 10.5 mg/dL    BUN 13 8 - 22 mg/dL    Creatinine 0.92 0.70 - 1.30 mg/dL    GFR MDRD Af Amer >60 >60 mL/min/1.73m2    GFR MDRD Non Af Amer >60 >60 mL/min/1.73m2    Narrative    Fasting Glucose reference range is 70-99 mg/dL per  American Diabetes Association (ADA) guidelines.   Hepatic Profile   Result Value Ref Range    Bilirubin, Total 1.0 0.0 - 1.0 mg/dL    Bilirubin, Direct 0.4 <=0.5 mg/dL    Protein, Total 7.1 6.0 - 8.0 g/dL    Albumin 4.6 3.5 - 5.0 g/dL    Alkaline Phosphatase 57 45 - 120 U/L    AST 30 0 - 40 U/L    ALT 28 0 - 45 U/L   Lipid Cascade   Result Value Ref Range    Cholesterol 204 (H) <=199 mg/dL    Triglycerides 55 <=149 mg/dL    HDL Cholesterol 97 >=40 mg/dL    LDL Calculated 96 <=129 mg/dL    Patient Fasting > 8hrs? Yes    Thyroid Cascade   Result Value  Ref Range    TSH 0.68 0.30 - 5.00 uIU/mL   PSA (Prostatic-Specific Antigen), Annual Screen   Result Value Ref Range    PSA 1.6 0.0 - 3.5 ng/mL    Narrative    Method is Abbott Prostate-Specific Antigen (PSA)  Standard-WHO 1st International (90:10)   C-Reactive Protein   Result Value Ref Range    CRP <0.1 0.0 - 0.8 mg/dL   HM1 (CBC with Diff)   Result Value Ref Range    WBC 8.2 4.0 - 11.0 thou/uL    RBC 4.65 4.40 - 6.20 mill/uL    Hemoglobin 14.4 14.0 - 18.0 g/dL    Hematocrit 42.2 40.0 - 54.0 %    MCV 91 80 - 100 fL    MCH 31.0 27.0 - 34.0 pg    MCHC 34.2 32.0 - 36.0 g/dL    RDW 11.5 11.0 - 14.5 %    Platelets 328 140 - 440 thou/uL    MPV 7.0 7.0 - 10.0 fL    Neutrophils % 68 50 - 70 %    Lymphocytes % 21 20 - 40 %    Monocytes % 9 2 - 10 %    Eosinophils % 2 0 - 6 %    Basophils % 1 0 - 2 %    Neutrophils Absolute 5.5 2.0 - 7.7 thou/uL    Lymphocytes Absolute 1.7 0.8 - 4.4 thou/uL    Monocytes Absolute 0.7 0.0 - 0.9 thou/uL    Eosinophils Absolute 0.2 0.0 - 0.4 thou/uL    Basophils Absolute 0.1 0.0 - 0.2 thou/uL   Erythrocyte Sedimentation Rate   Result Value Ref Range    Sed Rate 2 0 - 15 mm/hr       I enjoyed seeing you in the office at your appointment.  I am pleased with the results of your lab tests.    Any results with (*) are not of any significance.  Your inflammatory markers are fine.  Cholesterol is fine  Potassium is ok. A bit low due to the bp meds  Restrict your salt intake a little and this will nofrmalize.    Please call with questions or contact us using [employer] [employer] [facility] in [place].    Sincerely,        Electronically signed by Abraham Corrales MD

## 2021-06-20 NOTE — PROGRESS NOTES
OFFICE VISIT NOTE  Mao Larson   53 y.o. male            Assessment/Plan for  Mao Larson is a 53 y.o. male.  No Patient Care Coordination Note on file.       1. Cellulitis  Rx antibiotic  This is pretibial area  No evidence of lymphangitis  He has had some systemic symptoms-fever chills  - cephalexin (KEFLEX) 500 MG capsule; Take 1 capsule (500 mg total) by mouth 4 (four) times a day for 10 days.  Dispense: 40 capsule; Refill: 0  - HM1(CBC and Differential)  - Erythrocyte Sedimentation Rate  - Renal Function Profile  - HM1 (CBC with Diff)  - Uric Acid  - Hepatic Profile    2. Screen for colon cancer  Mother  young of colon cancer    3. RA (rheumatoid arthritis) (H)  Off methotrexate prednisone for some time.  [facility]  On hydroxychloroquine.  - Hepatic Profile    Clear-cut pretibial cellulitis  No evidence for phlebitis  No evidence of gout  Plan:  Keflex  Wear long  Pants to infection has clear  If worsens may need intravenous but without.    There are no Patient Instructions on file for this visit.    Diagnoses and all orders for this visit:    Screen for colon cancer    Cellulitis  -     cephalexin (KEFLEX) 500 MG capsule; Take 1 capsule (500 mg total) by mouth 4 (four) times a day for 10 days.  Dispense: 40 capsule; Refill: 0  -     HM1(CBC and Differential)  -     Erythrocyte Sedimentation Rate  -     Renal Function Profile  -     HM1 (CBC with Diff)  -     Uric Acid  -     Hepatic Profile    RA (rheumatoid arthritis) (H)  -     Hepatic Profile        Medications after visit  Current Outpatient Prescriptions   Medication Sig Dispense Refill     hydroxychloroquine sulfate (HYDROXYCHLOROQUINE ORAL) Take 200 mg by mouth 2 (two) times a day.       lisinopril-hydrochlorothiazide (PRINZIDE,ZESTORETIC) 20-12.5 mg per tablet TAKE ONE TABLET BY MOUTH ONE TIME DAILY 30 tablet 4     cephalexin (KEFLEX) 500 MG capsule Take 1 capsule (500 mg total) by mouth 4 (four) times a day for 10  days. 40 capsule 0     No current facility-administered medications for this visit.                       Abraham Corrales MD  Internal medicine  [facility]  [phone]  [email or web address]    Much or all of the text in this note was generated through the use of Dragon Dictate voice-to-text software. Errors in spelling or words which seem out of context are unintentional.   Sound alike errors, in particular, may have escaped editing.                 Subjective:   Chief Complaint:  Joint Swelling (Left ankle)    Healthy man  RA  Meds have been changed about a year and a half ago  Off methotrexate prednisone  No on hydroxychloroquine twice daily    For about a week patient has been having some supramalleolar discomfort over the distal pretibial area.  It is been warm and tender.  He is having trouble standing where he works.  He does have venous insufficiency.  He works 10 hour shifts.  He also walks the course okay when he is golfing.    He is not having any joint pain  Is not having any on-call arthritis    He did have fever and chills on Wednesday and Thursday night.  He has not taken any nonsteroidal    Review of Systems:     Extensive 10-point review of systems was performed. Please see the HPI for problem specific pertinent review of systems.     Patient does note his RA symptoms have improved    Otherwise, the following systems are noncontributory including constitutional, eyes, ears, nose and throat, cardiovascular, respiratory, gastrointestinal, genitourinary, musculoskeletal,neurological, skin and/or breast, endocrine, hematologic/lymph, allergic/immunologic and psychiatric.              Medications:  Current Outpatient Prescriptions on File Prior to Visit   Medication Sig     lisinopril-hydrochlorothiazide (PRINZIDE,ZESTORETIC) 20-12.5 mg per tablet TAKE ONE TABLET BY MOUTH ONE TIME DAILY     [DISCONTINUED] methotrexate 2.5 MG tablet Take 6 tablets by mouth once a  "week.     [DISCONTINUED] predniSONE (DELTASONE) 5 MG tablet Take 1.5 tablets (7.5 mg total) by mouth 2 (two) times a day.     No current facility-administered medications on file prior to visit.             Allergies:No Known Allergies    PSFHx: Tobacco Status:  He  reports that he has never smoked. He has never used smokeless tobacco.   Alcohol Status:    History   Alcohol use Not on file       reports that he has never smoked. He has never used smokeless tobacco. His alcohol and drug histories are not on file.    Objective:    /88 (Patient Site: Right Arm, Patient Position: Sitting, Cuff Size: Adult Regular)  Pulse 84  Ht 6' 0.25\" (1.835 m)  Wt 176 lb (79.8 kg)  SpO2 98%  BMI 23.7 kg/m2  Weight:   Wt Readings from Last 3 Encounters:   08/31/18 176 lb (79.8 kg)   08/30/16 201 lb (91.2 kg)   08/22/16 196 lb (88.9 kg)     BP Readings from Last 3 Encounters:   08/31/18 140/88   08/30/16 128/70   08/22/16 122/72         General-appears well, no acute distress.  Bilateral venous insufficiency  Skin is tan  Patient has a pretibial cellulitis  There are some skin excoriations  This measures approximately 10 cm  There is no lymphangitic streaking  His calves are very soft  His ankle is normal.  No inguinal adenopathy  He does not look systemically ill I       Review of clinical lab tests  Lab Results   Component Value Date    WBC 12.1 (H) 08/22/2016    HGB 15.0 08/22/2016    HCT 45.0 08/22/2016     08/22/2016    ALT 57 06/01/2016    AST 38 (H) 06/01/2016     08/22/2016    K 3.3 (L) 08/22/2016     08/22/2016    CREATININE 1.20 08/22/2016    BUN 9 08/22/2016    CO2 29 08/22/2016    TSH 1.28 06/01/2016       Glucose   Date/Time Value Ref Range Status   08/22/2016 10:23  74 - 125 mg/dL Final   07/18/2016 04:33 PM 89 74 - 125 mg/dL Final   06/01/2016 03:41 PM 92 74 - 125 mg/dL Final     No results found for this or any previous visit (from the past 24 hour(s)).    RADIOLOGY: No results " found.    Review of recent consultation- hp rheum

## 2021-06-22 NOTE — TELEPHONE ENCOUNTER
Refill Approved    Rx renewed per Medication Renewal Policy. Medication was last renewed on 8/1/18.    Leidy Alejandre, Care Connection Triage/Med Refill 1/6/2019     Requested Prescriptions   Pending Prescriptions Disp Refills     lisinopril-hydrochlorothiazide (PRINZIDE,ZESTORETIC) 20-12.5 mg per tablet [Pharmacy Med Name: Lisinopril-Hydrochlorothiazide Oral Tablet 20-12.5 MG] 30 tablet 3     Sig: TAKE ONE TABLET BY MOUTH ONE TIME DAILY    Diuretics/Combination Diuretics Refill Protocol  Passed - 1/5/2019  8:09 AM       Passed - Visit with PCP or prescribing provider visit in past 12 months    Last office visit with prescriber/PCP: 8/31/2018 Abraham Corrales MD OR same dept: 8/31/2018 Abraham Corrales MD OR same specialty: 8/31/2018 Abraham Corrales MD  Last physical: Visit date not found Last MTM visit: Visit date not found   Next visit within 3 mo: Visit date not found  Next physical within 3 mo: Visit date not found  Prescriber OR PCP: Abraham Corrales MD  Last diagnosis associated with med order: There are no diagnoses linked to this encounter.  If protocol passes may refill for 12 months if within 3 months of last provider visit (or a total of 15 months).            Passed - Serum Potassium in past 12 months     Lab Results   Component Value Date    Potassium 4.9 08/31/2018            Passed - Serum Sodium in past 12 months     Lab Results   Component Value Date    Sodium 139 08/31/2018            Passed - Blood pressure on file in past 12 months    BP Readings from Last 1 Encounters:   08/31/18 140/88            Passed - Serum Creatinine in past 12 months     Creatinine   Date Value Ref Range Status   08/31/2018 0.94 0.70 - 1.30 mg/dL Final

## 2021-06-25 NOTE — TELEPHONE ENCOUNTER
RN cannot approve Refill Request    RN can NOT refill this medication No established PCP. Last office visit: Visit date not found Last Physical: Visit date not found Last MTM visit: Visit date not found Last visit same specialty: 8/31/2018 Abraham Corrales MD.  Next visit within 3 mo: Visit date not found  Next physical within 3 mo: Visit date not found      Kendal Smith, [facility] Connection Triage/Med Refill 6/5/2021    Requested Prescriptions   Pending Prescriptions Disp Refills     lisinopriL-hydrochlorothiazide (PRINZIDE,ZESTORETIC) 20-12.5 mg per tablet [Pharmacy Med Name: Lisinopril-hydroCHLOROthiazide Oral Tablet 20-12.5 MG] 30 tablet 0     Sig: TAKE 1 TABLET BY MOUTH ONCE DAILY       Diuretics/Combination Diuretics Refill Protocol  Failed - 6/5/2021  5:03 AM        Failed - Visit with PCP or prescribing provider visit in past 12 months     Last office visit with prescriber/PCP: Visit date not found OR same dept: Visit date not found OR same specialty: 8/31/2018 Abraham Corrales MD  Last physical: Visit date not found Last MTM visit: Visit date not found   Next visit within 3 mo: Visit date not found  Next physical within 3 mo: Visit date not found  Prescriber OR PCP: Carloz Miller MD  Last diagnosis associated with med order: 1. HTN (hypertension)  - lisinopriL-hydrochlorothiazide (PRINZIDE,ZESTORETIC) 20-12.5 mg per tablet [Pharmacy Med Name: Lisinopril-hydroCHLOROthiazide Oral Tablet 20-12.5 MG]; TAKE 1 TABLET BY MOUTH ONCE DAILY   Dispense: 30 tablet; Refill: 0    If protocol passes may refill for 12 months if within 3 months of last provider visit (or a total of 15 months).             Failed - Serum Potassium in past 12 months      No results found for: LN-POTASSIUM          Failed - Serum Sodium in past 12 months      No results found for: LN-SODIUM          Failed - Blood pressure on file in past 12 months     BP Readings from Last 1 Encounters:   05/10/19 128/82             Failed - Serum  Creatinine in past 12 months      Creatinine   Date Value Ref Range Status   05/10/2019 0.92 0.70 - 1.30 mg/dL Final

## 2021-06-25 NOTE — TELEPHONE ENCOUNTER
Former patient of ??? & has not established care with another provider.  Please assign refill request to covering provider per Clinic standard process.      RN cannot approve Refill Request    RN can NOT refill this medication Protocol failed and NO refill given and no pcp. Last office visit: Visit date not found Last Physical: Visit date not found Last MTM visit: Visit date not found Last visit same specialty: 8/31/2018 Abraham Corrales MD.  Next visit within 3 mo: Visit date not found  Next physical within 3 mo: Visit date not found      Pierre Russell, [facility] Connection Triage/Med Refill 6/2/2021    Requested Prescriptions   Pending Prescriptions Disp Refills     lisinopriL-hydrochlorothiazide (PRINZIDE,ZESTORETIC) 20-12.5 mg per tablet [Pharmacy Med Name: Lisinopril-hydroCHLOROthiazide Oral Tablet 20-12.5 MG] 30 tablet 0     Sig: TAKE 1 TABLET BY MOUTH ONCE DAILY       Diuretics/Combination Diuretics Refill Protocol  Failed - 6/1/2021  2:51 PM        Failed - Visit with PCP or prescribing provider visit in past 12 months     Last office visit with prescriber/PCP: Visit date not found OR same dept: Visit date not found OR same specialty: 8/31/2018 Abraham Corrales MD  Last physical: Visit date not found Last MTM visit: Visit date not found   Next visit within 3 mo: Visit date not found  Next physical within 3 mo: Visit date not found  Prescriber OR PCP: Carloz Miller MD  Last diagnosis associated with med order: 1. HTN (hypertension)  - lisinopriL-hydrochlorothiazide (PRINZIDE,ZESTORETIC) 20-12.5 mg per tablet [Pharmacy Med Name: Lisinopril-hydroCHLOROthiazide Oral Tablet 20-12.5 MG]; TAKE 1 TABLET BY MOUTH ONCE DAILY   Dispense: 30 tablet; Refill: 0    If protocol passes may refill for 12 months if within 3 months of last provider visit (or a total of 15 months).             Failed - Serum Potassium in past 12 months      No results found for: LN-POTASSIUM          Failed - Serum Sodium in past 12 months       No results found for: LN-SODIUM          Failed - Blood pressure on file in past 12 months     BP Readings from Last 1 Encounters:   05/10/19 128/82             Failed - Serum Creatinine in past 12 months      Creatinine   Date Value Ref Range Status   05/10/2019 0.92 0.70 - 1.30 mg/dL Final

## 2021-06-25 NOTE — TELEPHONE ENCOUNTER
Last Office Visit  5/10/2019 Abraham Corrales MD  Notes:  1.  Healthcare maintenance examination-healthy man  2.  Rheumatoid arthritis-off Plaquenil per rheumatology.  No evidence of recurrence.  I checked a sed rate CRP  He needs to see an eye physician.  He has not done that in years  3.  Right epididymal cyst clinically.  Confirm with ultrasound  4.  Hypertension excellent control.  Check routine ECG-done without evidence of hypertrophy        Plan:  1.  Routine lab  2.  ECG  3.  I appointment  4.  Same medication  5.  Annual exam  6.  Ultrasound right testicle/cyst       Last Filled:  lisinopriL-hydrochlorothiazide (PRINZIDE,ZESTORETIC) 20-12.5 mg per tablet 30 tablet 0 4/27/2021  No   Sig: TAKE 1 TABLET BY MOUTH ONCE DAILY    Sent to pharmacy as: lisinopril 20 mg-hydrochlorothiazide 12.5 mg tablet (PRINZIDE,ZESTORETIC)   E-Prescribing Status: Receipt confirmed by pharmacy (4/27/2021  2:34 PM CDT)       Next OV:  6/7/2021 Star Rodriguez MD        Medication teed up for provider signature

## 2021-06-26 NOTE — PROGRESS NOTES
Office Visit - Follow Up   Mao Larson   55 y.o. male    Date of Visit: 2021    Chief Complaint   Patient presents with     Establish Care     meet & anais         Assessment and Plan   1. Hypertension, unspecified type  Uncontrolled.  Blood pressure here is high.  I at 140s over 80s.  He is going to follow his blood pressure over the next few months and let me know if it is running above 140/90 at home.  I will see him back in the fall for his annual wellness.    2. Rheumatoid arthritis involving multiple sites, unspecified whether rheumatoid factor present (H)  Good control with current regimen.  I did discuss with him higher incidence of heart disease with underlying rheumatoid arthritis.  I did discuss may be proceeding with a heart scan.  He will think about that and will discuss at our physical this fall.    He is due for tetanus booster today.  He declines as he has a lot of golf this week.  I urged him to get that at his soonest convenience.        Return in about 6 months (around 2021) for Annual physical.     History of Present Illness   This 55 y.o. old very pleasant new patient to my clinic here to establish care.  Formally followed by Dr. Salazar.  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  Wife works for the same company.  He has 3 sons.  1 is living in [place].  2 are here.  He has at least a couple grandchildren.  He is an avid golfer at [employer] and is in the men's club.  Oldest and youngest sons are also avid golfer's.  He has a history of rheumatoid arthritis and is doing well with Plaquenil.  He also has history of hypertension.    His mother  in her 50s of colon cancer.  He has been screened since age 38.  Father is alive but has heart issues due to mitral valve disease as well as pulmonary fibrosis.    He has 3 sisters who are doing well.    Review of Systems: A comprehensive review of systems was  "negative except as noted.     Medications, Allergies and Problem List   Reviewed, reconciled and updated  Post Discharge Medication Reconciliation Status:      Physical Exam   General Appearance:   Patient is a thin pleasant gentleman in no distress.  Very tanned skin.  HEENT is unremarkable.  Neck supple.  Lungs clear.  Heart regular.  Abdomen soft and nontender.  Extremities reveal varicosities of the lower extremities bilaterally.  No focal neurologic deficits.    /82 (Patient Site: Left Arm, Patient Position: Sitting, Cuff Size: Adult Regular)   Pulse (!) 58   Temp 97.8  F (36.6  C)   Ht 6' 1\" (1.854 m)   Wt 186 lb (84.4 kg)   SpO2 99%   BMI 24.54 kg/m           Additional Information   Current Outpatient Medications   Medication Sig Dispense Refill     hydrOXYchloroQUINE (PLAQUENIL) 200 mg tablet Take 200 mg by mouth. Dr. Lamar ()       lisinopriL-hydrochlorothiazide (PRINZIDE,ZESTORETIC) 20-12.5 mg per tablet TAKE 1 TABLET BY MOUTH ONCE DAILY  90 tablet 3     No current facility-administered medications for this visit.      No Known Allergies  Social History     Tobacco Use     Smoking status: Never Smoker     Smokeless tobacco: Never Used   Substance Use Topics     Alcohol use: Not on file     Drug use: Not on file       Review and/or order of clinical lab tests:  Review and/or order of radiology tests:  Review and/or order of medicine tests:  Discussion of test results with performing physician:  Decision to obtain old records and/or obtain history from someone other than the patient:  Review and summarization of old records and/or obtaining history from someone other than the patient and.or discussion of case with another health care provider:  Independent visualization of image, tracing or specimen itself:    Time: 35 minutes spent with patient establishing care.     Star Rodriguez MD  "

## 2021-07-01 ENCOUNTER — COMMUNICATION - HEALTHEAST (OUTPATIENT)
Dept: INTERNAL MEDICINE | Facility: CLINIC | Age: 56
End: 2021-07-01

## 2021-07-01 DIAGNOSIS — I10 HTN (HYPERTENSION): ICD-10-CM

## 2021-07-02 RX ORDER — LISINOPRIL AND HYDROCHLOROTHIAZIDE 12.5; 2 MG/1; MG/1
TABLET ORAL
Qty: 90 TABLET | Refills: 3 | Status: SHIPPED | OUTPATIENT
Start: 2021-07-02 | End: 2021-12-09

## 2021-07-04 NOTE — TELEPHONE ENCOUNTER
Telephone Encounter by Kendal Smith, RN at 7/1/2021  6:45 PM     Author: Kendal Smith RN Service: -- Author Type: Registered Nurse    Filed: 7/1/2021  6:46 PM Encounter Date: 7/1/2021 Status: Signed    : Kendal Smith RN (Registered Nurse)       RN cannot approve Refill Request    RN can NOT refill this medication No established PCP. Last office visit: Visit date not found Last Physical: Visit date not found Last MTM visit: Visit date not found Last visit same specialty: 6/7/2021 Star Rodriguez MD.  Next visit within 3 mo: Visit date not found  Next physical within 3 mo: Visit date not found      [facility] Triage/Med Refill 7/1/2021    Requested Prescriptions   Pending Prescriptions Disp Refills   ? lisinopriL-hydrochlorothiazide (PRINZIDE,ZESTORETIC) 20-12.5 mg per tablet [Pharmacy Med Name: Lisinopril-hydroCHLOROthiazide Oral Tablet 20-12.5 MG] 30 tablet 0     Sig: TAKE 1 TABLET BY MOUTH ONCE DAILY       Diuretics/Combination Diuretics Refill Protocol  Failed - 7/1/2021  5:57 PM        Failed - Serum Potassium in past 12 months      No results found for: LN-POTASSIUM          Failed - Serum Sodium in past 12 months      No results found for: LN-SODIUM          Failed - Serum Creatinine in past 12 months      Creatinine   Date Value Ref Range Status   05/10/2019 0.92 0.70 - 1.30 mg/dL Final             Passed - Visit with PCP or prescribing provider visit in past 12 months     Last office visit with prescriber/PCP: Visit date not found OR same dept: Visit date not found OR same specialty: 6/7/2021 Star Rodriguez MD  Last physical: Visit date not found Last MTM visit: Visit date not found   Next visit within 3 mo: Visit date not found  Next physical within 3 mo: Visit date not found  Prescriber OR PCP: Carloz Miller MD  Last diagnosis associated with med order: 1. HTN (hypertension)  - lisinopriL-hydrochlorothiazide (PRINZIDE,ZESTORETIC) 20-12.5 mg per tablet  [Pharmacy Med Name: Lisinopril-hydroCHLOROthiazide Oral Tablet 20-12.5 MG]; TAKE 1 TABLET BY MOUTH ONCE DAILY   Dispense: 30 tablet; Refill: 0    If protocol passes may refill for 12 months if within 3 months of last provider visit (or a total of 15 months).             Passed - Blood pressure on file in past 12 months     BP Readings from Last 1 Encounters:   06/07/21 138/82

## 2021-07-06 VITALS
WEIGHT: 186 LBS | SYSTOLIC BLOOD PRESSURE: 138 MMHG | HEART RATE: 58 BPM | HEIGHT: 73 IN | DIASTOLIC BLOOD PRESSURE: 82 MMHG | TEMPERATURE: 97.8 F | BODY MASS INDEX: 24.65 KG/M2 | OXYGEN SATURATION: 99 %

## 2021-07-14 DIAGNOSIS — I10 HYPERTENSION, UNSPECIFIED TYPE: Primary | ICD-10-CM

## 2021-07-14 DIAGNOSIS — I10 HTN (HYPERTENSION): ICD-10-CM

## 2021-07-14 RX ORDER — LISINOPRIL AND HYDROCHLOROTHIAZIDE 12.5; 2 MG/1; MG/1
TABLET ORAL
Qty: 90 TABLET | Refills: 3 | Status: SHIPPED | OUTPATIENT
Start: 2021-07-14 | End: 2022-07-25

## 2021-08-22 ENCOUNTER — HEALTH MAINTENANCE LETTER (OUTPATIENT)
Age: 56
End: 2021-08-22

## 2021-10-17 ENCOUNTER — HEALTH MAINTENANCE LETTER (OUTPATIENT)
Age: 56
End: 2021-10-17

## 2021-12-09 ENCOUNTER — OFFICE VISIT (OUTPATIENT)
Dept: INTERNAL MEDICINE | Facility: CLINIC | Age: 56
End: 2021-12-09
Payer: COMMERCIAL

## 2021-12-09 VITALS
BODY MASS INDEX: 23.46 KG/M2 | DIASTOLIC BLOOD PRESSURE: 68 MMHG | OXYGEN SATURATION: 96 % | HEART RATE: 66 BPM | WEIGHT: 177 LBS | TEMPERATURE: 97.9 F | SYSTOLIC BLOOD PRESSURE: 120 MMHG | HEIGHT: 73 IN

## 2021-12-09 DIAGNOSIS — L29.9 ITCHY SKIN: ICD-10-CM

## 2021-12-09 DIAGNOSIS — Z00.00 ROUTINE ADULT HEALTH MAINTENANCE: Primary | ICD-10-CM

## 2021-12-09 DIAGNOSIS — Z23 NEED FOR PROPHYLACTIC VACCINATION WITH TETANUS-DIPHTHERIA (TD): ICD-10-CM

## 2021-12-09 DIAGNOSIS — N50.3 EPIDIDYMAL CYST: ICD-10-CM

## 2021-12-09 DIAGNOSIS — I10 PRIMARY HYPERTENSION: ICD-10-CM

## 2021-12-09 DIAGNOSIS — Z23 HIGH PRIORITY FOR 2019-NCOV VACCINE: ICD-10-CM

## 2021-12-09 DIAGNOSIS — M06.9 RHEUMATOID ARTHRITIS INVOLVING MULTIPLE SITES, UNSPECIFIED WHETHER RHEUMATOID FACTOR PRESENT (H): ICD-10-CM

## 2021-12-09 DIAGNOSIS — I86.1 VARICOCELE: ICD-10-CM

## 2021-12-09 LAB
ALBUMIN SERPL-MCNC: 4.4 G/DL (ref 3.5–5)
ALBUMIN UR-MCNC: NEGATIVE MG/DL
ALP SERPL-CCNC: 72 U/L (ref 45–120)
ALT SERPL W P-5'-P-CCNC: 24 U/L (ref 0–45)
ANION GAP SERPL CALCULATED.3IONS-SCNC: 13 MMOL/L (ref 5–18)
APPEARANCE UR: CLEAR
AST SERPL W P-5'-P-CCNC: 24 U/L (ref 0–40)
BILIRUB SERPL-MCNC: 1.2 MG/DL (ref 0–1)
BILIRUB UR QL STRIP: NEGATIVE
BUN SERPL-MCNC: 13 MG/DL (ref 8–22)
CALCIUM SERPL-MCNC: 9.5 MG/DL (ref 8.5–10.5)
CHLORIDE BLD-SCNC: 100 MMOL/L (ref 98–107)
CHOLEST SERPL-MCNC: 209 MG/DL
CO2 SERPL-SCNC: 26 MMOL/L (ref 22–31)
COLOR UR AUTO: YELLOW
CREAT SERPL-MCNC: 0.98 MG/DL (ref 0.7–1.3)
ERYTHROCYTE [DISTWIDTH] IN BLOOD BY AUTOMATED COUNT: 11.6 % (ref 10–15)
FASTING STATUS PATIENT QL REPORTED: YES
GFR SERPL CREATININE-BSD FRML MDRD: 85 ML/MIN/1.73M2
GLUCOSE BLD-MCNC: 106 MG/DL (ref 70–125)
GLUCOSE UR STRIP-MCNC: NEGATIVE MG/DL
HCT VFR BLD AUTO: 45.2 % (ref 40–53)
HDLC SERPL-MCNC: 96 MG/DL
HGB BLD-MCNC: 15.1 G/DL (ref 13.3–17.7)
HGB UR QL STRIP: NEGATIVE
KETONES UR STRIP-MCNC: NEGATIVE MG/DL
LDLC SERPL CALC-MCNC: 102 MG/DL
LEUKOCYTE ESTERASE UR QL STRIP: NEGATIVE
MCH RBC QN AUTO: 30.1 PG (ref 26.5–33)
MCHC RBC AUTO-ENTMCNC: 33.4 G/DL (ref 31.5–36.5)
MCV RBC AUTO: 90 FL (ref 78–100)
NITRATE UR QL: NEGATIVE
PH UR STRIP: 5.5 [PH] (ref 5–8)
PLATELET # BLD AUTO: 293 10E3/UL (ref 150–450)
POTASSIUM BLD-SCNC: 4.5 MMOL/L (ref 3.5–5)
PROT SERPL-MCNC: 7.1 G/DL (ref 6–8)
PSA SERPL-MCNC: 1.15 UG/L (ref 0–3.5)
RBC # BLD AUTO: 5.02 10E6/UL (ref 4.4–5.9)
SODIUM SERPL-SCNC: 139 MMOL/L (ref 136–145)
SP GR UR STRIP: 1.02 (ref 1–1.03)
TRIGL SERPL-MCNC: 57 MG/DL
UROBILINOGEN UR STRIP-ACNC: 0.2 E.U./DL
WBC # BLD AUTO: 4.7 10E3/UL (ref 4–11)

## 2021-12-09 PROCEDURE — 36415 COLL VENOUS BLD VENIPUNCTURE: CPT | Performed by: INTERNAL MEDICINE

## 2021-12-09 PROCEDURE — 90714 TD VACC NO PRESV 7 YRS+ IM: CPT | Performed by: INTERNAL MEDICINE

## 2021-12-09 PROCEDURE — 0004A COVID-19,PF,PFIZER (12+ YRS): CPT | Performed by: INTERNAL MEDICINE

## 2021-12-09 PROCEDURE — 80061 LIPID PANEL: CPT | Performed by: INTERNAL MEDICINE

## 2021-12-09 PROCEDURE — G0103 PSA SCREENING: HCPCS | Performed by: INTERNAL MEDICINE

## 2021-12-09 PROCEDURE — 91300 COVID-19,PF,PFIZER (12+ YRS): CPT | Performed by: INTERNAL MEDICINE

## 2021-12-09 PROCEDURE — 81003 URINALYSIS AUTO W/O SCOPE: CPT | Performed by: INTERNAL MEDICINE

## 2021-12-09 PROCEDURE — 99396 PREV VISIT EST AGE 40-64: CPT | Mod: 25 | Performed by: INTERNAL MEDICINE

## 2021-12-09 PROCEDURE — 99213 OFFICE O/P EST LOW 20 MIN: CPT | Mod: 25 | Performed by: INTERNAL MEDICINE

## 2021-12-09 PROCEDURE — 90471 IMMUNIZATION ADMIN: CPT | Performed by: INTERNAL MEDICINE

## 2021-12-09 PROCEDURE — 80053 COMPREHEN METABOLIC PANEL: CPT | Performed by: INTERNAL MEDICINE

## 2021-12-09 PROCEDURE — 85027 COMPLETE CBC AUTOMATED: CPT | Performed by: INTERNAL MEDICINE

## 2021-12-09 ASSESSMENT — MIFFLIN-ST. JEOR: SCORE: 1668.81

## 2021-12-09 NOTE — PROGRESS NOTES
SUBJECTIVE:   CC: Mao Larson is an 56 year old male who presents for preventative health visit.     Fletcher comes in today for physical.  He is a new patient to me have seen him once before.  Has a history of rheumatoid arthritis on Plaquenil.  Also with hypertension.  He notes a cyst in his scrotum or mass in scrotum which has been followed as well as something else on ultrasound he states.  He wonders if there is anything that needs to be done about this.  He remains very active exercising several days a week.  He is a excellent golfer with a handicap of about 4.  He notes some itchy skin on his back and wonders about that and what he could do for it.  He is due for a tetanus.  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  Wife just had a partial colectomy due to what sounds like a stricture.  There was no cancer.  Children are doing well and he plans to go to [place] next summer for golf tournament.  Patient has been advised of split billing requirements and indicates understanding: Yes  Healthy Habits:     Getting at least 3 servings of Calcium per day:  Yes    Bi-annual eye exam:  NO    Dental care twice a year:  Yes    Sleep apnea or symptoms of sleep apnea:  None    Diet:  Regular (no restrictions)    Frequency of exercise:  4-5 days/week    Duration of exercise:  45-60 minutes    Taking medications regularly:  Yes    Medication side effects:  None    PHQ-2 Total Score: 0    Additional concerns today:  No              Today's PHQ-2 Score:   PHQ-2 ( 1999 Pfizer) 12/9/2021   Q1: Little interest or pleasure in doing things 0   Q2: Feeling down, depressed or hopeless 0   PHQ-2 Score 0   Q1: Little interest or pleasure in doing things Not at all   Q2: Feeling down, depressed or hopeless Not at all   PHQ-2 Score 0       Abuse: Current or Past(Physical, Sexual or Emotional)- No  Do you feel safe in your environment? Yes    Have you ever done Advance Care Planning? (For example, a Health Directive, POLST, or a  discussion with a medical provider or your loved ones about your wishes): No, advance care planning information given to patient to review.  Patient plans to discuss their wishes with loved ones or provider.      Social History     Tobacco Use     Smoking status: Never Smoker     Smokeless tobacco: Never Used   Substance Use Topics     Alcohol use: Not on file     If you drink alcohol do you typically have >3 drinks per day or >7 drinks per week? No    Alcohol Use 12/9/2021   Prescreen: >3 drinks/day or >7 drinks/week? Yes   AUDIT SCORE  6     AUDIT - Alcohol Use Disorders Identification Test - Reproduced from the World Health Organization Audit 2001 (Second Edition) 12/9/2021   1.  How often do you have a drink containing alcohol? 2 to 3 times a week   2.  How many drinks containing alcohol do you have on a typical day when you are drinking? 3 or 4   3.  How often do you have five or more drinks on one occasion? Monthly   4.  How often during the last year have you found that you were not able to stop drinking once you had started? Never   5.  How often during the last year have you failed to do what was normally expected of you because of drinking? Never   6.  How often during the last year have you needed a first drink in the morning to get yourself going after a heavy drinking session? Never   7.  How often during the last year have you had a feeling of guilt or remorse after drinking? Never   8.  How often during the last year have you been unable to remember what happened the night before because of your drinking? Never   9.  Have you or someone else been injured because of your drinking? No   10. Has a relative, friend, doctor or other health care worker been concerned about your drinking or suggested you cut down? No   TOTAL SCORE 6       Last PSA:   Prostate Specific Antigen Screen   Date Value Ref Range Status   05/10/2019 1.6 0.0 - 3.5 ng/mL Final       Reviewed orders with patient. Reviewed health  "maintenance and updated orders accordingly - Yes      Reviewed and updated as needed this visit by clinical staff  Tobacco  Allergies              Reviewed and updated as needed this visit by Provider                   Review of Systems   ROS: 10 point ROS neg other than the symptoms noted above in the HPI.    OBJECTIVE:   Ht 1.842 m (6' 0.5\")   Wt 80.3 kg (177 lb)   BMI 23.68 kg/m      Physical Exam  GENERAL: healthy, alert and no distress  EYES: Eyes grossly normal to inspection, PERRL and conjunctivae and sclerae normal  HENT: normal cephalic/atraumatic and ear canals and TM's normal  NECK: no adenopathy, no asymmetry, masses, or scars and thyroid normal to palpation  RESP: lungs clear to auscultation - no rales, rhonchi or wheezes  CV: regular rate and rhythm, normal S1 S2, no S3 or S4, no murmur, click or rub, no peripheral edema and peripheral pulses strong  ABDOMEN: soft, nontender, no hepatosplenomegaly, no masses and bowel sounds normal   (male): penis normal without urethral discharge and right epididymal cyst and left testicular atrophy seems.  RECTAL: normal sphincter tone, no rectal masses, prostate normal size, smooth, nontender without nodules or masses  MS: no gross musculoskeletal defects noted, no edema  SKIN: no suspicious lesions or rashes  NEURO: Normal strength and tone, mentation intact and speech normal  PSYCH: mentation appears normal, affect normal/bright    Diagnostic Test Results:  Labs reviewed in Epic    ASSESSMENT/PLAN:   1. Routine adult health maintenance  Tetanus and Covid booster today.  We also discussed shingles vaccination today.  - Comprehensive metabolic panel (BMP + Alb, Alk Phos, ALT, AST, Total. Bili, TP); Future  - CBC with platelets; Future  - Lipid panel reflex to direct LDL Fasting; Future  - PSA, screen; Future  - UA Macro with Reflex to Micro and Culture - lab collect; Future  - Comprehensive metabolic panel (BMP + Alb, Alk Phos, ALT, AST, Total. Bili, TP)  - " "CBC with platelets  - Lipid panel reflex to direct LDL Fasting  - PSA, screen  - UA Macro with Reflex to Micro and Culture - lab collect  - TD PRESERV FREE, IM (7+ YRS) (DECAVAC/TENIVAC)    2. Rheumatoid arthritis involving multiple sites, unspecified whether rheumatoid factor present (H)  Good control with current regimen.  Continue plan per rheumatology.  - Comprehensive metabolic panel (BMP + Alb, Alk Phos, ALT, AST, Total. Bili, TP); Future  - CBC with platelets; Future  - Comprehensive metabolic panel (BMP + Alb, Alk Phos, ALT, AST, Total. Bili, TP)  - CBC with platelets    3. Primary hypertension  Excellent control.  I will see him back yearly.    4. High priority for 2019-nCoV vaccine    - COVID-19,PF,PFIZER (12+ Yrs PURPLE LABEL)    5. Epididymal cyst  Reassurance today.  On exam does not seem any larger than what was described on ultrasound in the past.    6. Varicocele  Reassurance today.    7. Itchy skin  I do not see anything on his skin exam today.  I urged good lubricating moisturizer.    8. Need for prophylactic vaccination with tetanus-diphtheria (Td)    - TD PRESERV FREE, IM (7+ YRS) (DECAVAC/TENIVAC)    Patient has been advised of split billing requirements and indicates understanding: Yes  COUNSELING:   Reviewed preventive health counseling, as reflected in patient instructions    Estimated body mass index is 23.68 kg/m  as calculated from the following:    Height as of this encounter: 1.842 m (6' 0.5\").    Weight as of this encounter: 80.3 kg (177 lb).         He reports that he has never smoked. He has never used smokeless tobacco.      Counseling Resources:  ATP IV Guidelines  Pooled Cohorts Equation Calculator  FRAX Risk Assessment  ICSI Preventive Guidelines  Dietary Guidelines for Americans, 2010  USDA's MyPlate  ASA Prophylaxis  Lung CA Screening    STEVEN MCKEE MD  [facility]  "

## 2021-12-09 NOTE — LETTER
December 14, 2021      Mao Larson  [address] [facility] [address]        Dear ,    We are writing to inform you of your test results.    Fletcher, everything here looks good.  Your cholesterol once again is very good.  Excellent actually.  If you want to pursue that heart scan however, please let me know and I can get an order in for you.       Resulted Orders   Comprehensive metabolic panel (BMP + Alb, Alk Phos, ALT, AST, Total. Bili, TP)   Result Value Ref Range    Sodium 139 136 - 145 mmol/L    Potassium 4.5 3.5 - 5.0 mmol/L    Chloride 100 98 - 107 mmol/L    Carbon Dioxide (CO2) 26 22 - 31 mmol/L    Anion Gap 13 5 - 18 mmol/L    Urea Nitrogen 13 8 - 22 mg/dL    Creatinine 0.98 0.70 - 1.30 mg/dL    Calcium 9.5 8.5 - 10.5 mg/dL    Glucose 106 70 - 125 mg/dL    Alkaline Phosphatase 72 45 - 120 U/L    AST 24 0 - 40 U/L    ALT 24 0 - 45 U/L    Protein Total 7.1 6.0 - 8.0 g/dL    Albumin 4.4 3.5 - 5.0 g/dL    Bilirubin Total 1.2 (H) 0.0 - 1.0 mg/dL    GFR Estimate 85 >60 mL/min/1.73m2      Comment:      As of July 11, 2021, eGFR is calculated by the CKD-EPI creatinine equation, without race adjustment. eGFR can be influenced by muscle mass, exercise, and diet. The reported eGFR is an estimation only and is only applicable if the renal function is stable.   CBC with platelets   Result Value Ref Range    WBC Count 4.7 4.0 - 11.0 10e3/uL    RBC Count 5.02 4.40 - 5.90 10e6/uL    Hemoglobin 15.1 13.3 - 17.7 g/dL    Hematocrit 45.2 40.0 - 53.0 %    MCV 90 78 - 100 fL    MCH 30.1 26.5 - 33.0 pg    MCHC 33.4 31.5 - 36.5 g/dL    RDW 11.6 10.0 - 15.0 %    Platelet Count 293 150 - 450 10e3/uL   Lipid panel reflex to direct LDL Fasting   Result Value Ref Range    Cholesterol 209 (H) <=199 mg/dL    Triglycerides 57 <=149 mg/dL    Direct Measure HDL 96 >=40 mg/dL      Comment:      HDL Cholesterol Reference Range:     0-2 years:   No reference ranges established for patients under 2 years old  at [facility] for lipid analytes.    2-8 years:  Greater than 45 mg/dL     18 years and older:   Female: Greater than or equal to 50 mg/dL   Male:   Greater than or equal to 40 mg/dL    LDL Cholesterol Calculated 102 <=129 mg/dL    Patient Fasting > 8hrs? Yes    PSA, screen   Result Value Ref Range    Prostate Specific Antigen Screen 1.15 0.00 - 3.50 ug/L    Narrative    Assay Method is Abbott Prostate-Specific Antigen (PSA)  Standard-WHO 1st International (90:10)   UA Macro with Reflex to Micro and Culture - lab collect   Result Value Ref Range    Color Urine Yellow Colorless, Straw, Light Yellow, Yellow    Appearance Urine Clear Clear    Glucose Urine Negative Negative mg/dL    Bilirubin Urine Negative Negative    Ketones Urine Negative Negative mg/dL    Specific Gravity Urine 1.020 1.005 - 1.030    Blood Urine Negative Negative    pH Urine 5.5 5.0 - 8.0    Protein Albumin Urine Negative Negative mg/dL    Urobilinogen Urine 0.2 0.2, 1.0 E.U./dL    Nitrite Urine Negative Negative    Leukocyte Esterase Urine Negative Negative    Narrative    Microscopic not indicated       If you have any questions or concerns, please call the clinic at the number listed above.       Sincerely,      Star Rodriguez MD

## 2021-12-15 ENCOUNTER — TRANSFERRED RECORDS (OUTPATIENT)
Dept: HEALTH INFORMATION MANAGEMENT | Facility: CLINIC | Age: 56
End: 2021-12-15
Payer: COMMERCIAL

## 2021-12-21 ENCOUNTER — TRANSFERRED RECORDS (OUTPATIENT)
Dept: HEALTH INFORMATION MANAGEMENT | Facility: CLINIC | Age: 56
End: 2021-12-21
Payer: COMMERCIAL

## 2022-07-25 DIAGNOSIS — I10 HYPERTENSION, UNSPECIFIED TYPE: ICD-10-CM

## 2022-07-25 RX ORDER — LISINOPRIL AND HYDROCHLOROTHIAZIDE 12.5; 2 MG/1; MG/1
TABLET ORAL
Qty: 90 TABLET | Refills: 1 | Status: SHIPPED | OUTPATIENT
Start: 2022-07-25 | End: 2023-01-13

## 2022-07-25 NOTE — TELEPHONE ENCOUNTER
"Last Written Prescription Date:  7/14/21  Last Fill Quantity: 90,  # refills: 3   Last office visit provider:  12/9/21     Requested Prescriptions   Pending Prescriptions Disp Refills     lisinopril-hydrochlorothiazide (ZESTORETIC) 20-12.5 MG tablet [Pharmacy Med Name: Lisinopril-hydroCHLOROthiazide Oral Tablet 20-12.5 MG] 90 tablet 0     Sig: TAKE 1 TABLET BY MOUTH ONCE DAILY       Diuretics (Including Combos) Protocol Passed - 7/25/2022  9:50 AM        Passed - Blood pressure under 140/90 in past 12 months     BP Readings from Last 3 Encounters:   12/09/21 120/68   06/07/21 138/82                 Passed - Recent (12 mo) or future (30 days) visit within the authorizing provider's specialty     Patient has had an office visit with the authorizing provider or a provider within the authorizing providers department within the previous 12 mos or has a future within next 30 days. See \"Patient Info\" tab in inbasket, or \"Choose Columns\" in Meds & Orders section of the refill encounter.              Passed - Medication is active on med list        Passed - Patient is age 18 or older        Passed - Normal serum creatinine on file in past 12 months     Recent Labs   Lab Test 12/09/21  0758   CR 0.98              Passed - Normal serum potassium on file in past 12 months     Recent Labs   Lab Test 12/09/21  0758   POTASSIUM 4.5                    Passed - Normal serum sodium on file in past 12 months     Recent Labs   Lab Test 12/09/21  0758                ACE Inhibitors (Including Combos) Protocol Passed - 7/25/2022  9:50 AM        Passed - Blood pressure under 140/90 in past 12 months     BP Readings from Last 3 Encounters:   12/09/21 120/68   06/07/21 138/82                 Passed - Recent (12 mo) or future (30 days) visit within the authorizing provider's specialty     Patient has had an office visit with the authorizing provider or a provider within the authorizing providers department within the previous 12 mos or " "has a future within next 30 days. See \"Patient Info\" tab in inbasket, or \"Choose Columns\" in Meds & Orders section of the refill encounter.              Passed - Medication is active on med list        Passed - Patient is age 18 or older        Passed - Normal serum creatinine on file in past 12 months     Recent Labs   Lab Test 12/09/21  0758   CR 0.98       Ok to refill medication if creatinine is low          Passed - Normal serum potassium on file in past 12 months     Recent Labs   Lab Test 12/09/21  0758   POTASSIUM 4.5                  Pierre Russell RN 07/25/22 9:50 AM  "

## 2022-10-01 ENCOUNTER — HEALTH MAINTENANCE LETTER (OUTPATIENT)
Age: 57
End: 2022-10-01

## 2022-12-15 ENCOUNTER — OFFICE VISIT (OUTPATIENT)
Dept: INTERNAL MEDICINE | Facility: CLINIC | Age: 57
End: 2022-12-15
Payer: COMMERCIAL

## 2022-12-15 VITALS
OXYGEN SATURATION: 98 % | BODY MASS INDEX: 23.46 KG/M2 | DIASTOLIC BLOOD PRESSURE: 80 MMHG | HEIGHT: 73 IN | SYSTOLIC BLOOD PRESSURE: 132 MMHG | TEMPERATURE: 98.2 F | WEIGHT: 177 LBS | HEART RATE: 72 BPM

## 2022-12-15 DIAGNOSIS — M06.9 RHEUMATOID ARTHRITIS INVOLVING MULTIPLE SITES, UNSPECIFIED WHETHER RHEUMATOID FACTOR PRESENT (H): ICD-10-CM

## 2022-12-15 DIAGNOSIS — Z00.00 ROUTINE ADULT HEALTH MAINTENANCE: Primary | ICD-10-CM

## 2022-12-15 DIAGNOSIS — N52.9 ERECTILE DYSFUNCTION, UNSPECIFIED ERECTILE DYSFUNCTION TYPE: ICD-10-CM

## 2022-12-15 DIAGNOSIS — I10 PRIMARY HYPERTENSION: ICD-10-CM

## 2022-12-15 LAB
ALBUMIN SERPL BCG-MCNC: 4.7 G/DL (ref 3.5–5.2)
ALBUMIN UR-MCNC: NEGATIVE MG/DL
ALP SERPL-CCNC: 77 U/L (ref 40–129)
ALT SERPL W P-5'-P-CCNC: 27 U/L (ref 10–50)
ANION GAP SERPL CALCULATED.3IONS-SCNC: 11 MMOL/L (ref 7–15)
APPEARANCE UR: CLEAR
AST SERPL W P-5'-P-CCNC: 34 U/L (ref 10–50)
BILIRUB SERPL-MCNC: 0.9 MG/DL
BILIRUB UR QL STRIP: NEGATIVE
BUN SERPL-MCNC: 16 MG/DL (ref 8–23)
CALCIUM SERPL-MCNC: 9.1 MG/DL (ref 8.6–10)
CHLORIDE SERPL-SCNC: 102 MMOL/L (ref 98–107)
CHOLEST SERPL-MCNC: 214 MG/DL
COLOR UR AUTO: YELLOW
CREAT SERPL-MCNC: 1.05 MG/DL (ref 0.67–1.17)
DEPRECATED HCO3 PLAS-SCNC: 27 MMOL/L (ref 22–29)
ERYTHROCYTE [DISTWIDTH] IN BLOOD BY AUTOMATED COUNT: 11.6 % (ref 10–15)
GFR SERPL CREATININE-BSD FRML MDRD: 82 ML/MIN/1.73M2
GLUCOSE SERPL-MCNC: 93 MG/DL (ref 70–99)
GLUCOSE UR STRIP-MCNC: NEGATIVE MG/DL
HCT VFR BLD AUTO: 45.3 % (ref 40–53)
HDLC SERPL-MCNC: 106 MG/DL
HGB BLD-MCNC: 14.8 G/DL (ref 13.3–17.7)
HGB UR QL STRIP: NEGATIVE
KETONES UR STRIP-MCNC: NEGATIVE MG/DL
LDLC SERPL CALC-MCNC: 99 MG/DL
LEUKOCYTE ESTERASE UR QL STRIP: NEGATIVE
MCH RBC QN AUTO: 29.8 PG (ref 26.5–33)
MCHC RBC AUTO-ENTMCNC: 32.7 G/DL (ref 31.5–36.5)
MCV RBC AUTO: 91 FL (ref 78–100)
NITRATE UR QL: NEGATIVE
NONHDLC SERPL-MCNC: 108 MG/DL
PH UR STRIP: 6 [PH] (ref 5–8)
PLATELET # BLD AUTO: 258 10E3/UL (ref 150–450)
POTASSIUM SERPL-SCNC: 4.2 MMOL/L (ref 3.4–5.3)
PROT SERPL-MCNC: 6.9 G/DL (ref 6.4–8.3)
PSA SERPL-MCNC: 1.23 NG/ML (ref 0–3.5)
RBC # BLD AUTO: 4.96 10E6/UL (ref 4.4–5.9)
SODIUM SERPL-SCNC: 140 MMOL/L (ref 136–145)
SP GR UR STRIP: 1.02 (ref 1–1.03)
TRIGL SERPL-MCNC: 45 MG/DL
UROBILINOGEN UR STRIP-ACNC: 0.2 E.U./DL
WBC # BLD AUTO: 4.5 10E3/UL (ref 4–11)

## 2022-12-15 PROCEDURE — 36415 COLL VENOUS BLD VENIPUNCTURE: CPT | Performed by: INTERNAL MEDICINE

## 2022-12-15 PROCEDURE — 99396 PREV VISIT EST AGE 40-64: CPT | Performed by: INTERNAL MEDICINE

## 2022-12-15 PROCEDURE — 80053 COMPREHEN METABOLIC PANEL: CPT | Performed by: INTERNAL MEDICINE

## 2022-12-15 PROCEDURE — 85027 COMPLETE CBC AUTOMATED: CPT | Performed by: INTERNAL MEDICINE

## 2022-12-15 PROCEDURE — 84403 ASSAY OF TOTAL TESTOSTERONE: CPT | Performed by: INTERNAL MEDICINE

## 2022-12-15 PROCEDURE — 99213 OFFICE O/P EST LOW 20 MIN: CPT | Mod: 25 | Performed by: INTERNAL MEDICINE

## 2022-12-15 PROCEDURE — 81003 URINALYSIS AUTO W/O SCOPE: CPT | Performed by: INTERNAL MEDICINE

## 2022-12-15 PROCEDURE — G0103 PSA SCREENING: HCPCS | Performed by: INTERNAL MEDICINE

## 2022-12-15 PROCEDURE — 80061 LIPID PANEL: CPT | Performed by: INTERNAL MEDICINE

## 2022-12-15 RX ORDER — HYDROXYCHLOROQUINE SULFATE 200 MG/1
200 TABLET, FILM COATED ORAL 2 TIMES DAILY
COMMUNITY
Start: 2022-11-26 | End: 2023-12-21

## 2022-12-15 RX ORDER — SILDENAFIL 100 MG/1
50-100 TABLET, FILM COATED ORAL DAILY PRN
Qty: 6 TABLET | Refills: 4 | Status: SHIPPED | OUTPATIENT
Start: 2022-12-15 | End: 2023-01-13

## 2022-12-15 ASSESSMENT — ENCOUNTER SYMPTOMS
CHILLS: 0
ARTHRALGIAS: 0
SORE THROAT: 0
DIZZINESS: 0
MYALGIAS: 0
HEMATURIA: 0
NERVOUS/ANXIOUS: 0
ABDOMINAL PAIN: 0
CONSTIPATION: 0
JOINT SWELLING: 0
FREQUENCY: 0
DYSURIA: 0
NAUSEA: 0
DIARRHEA: 0
COUGH: 0
PALPITATIONS: 0
HEADACHES: 0
FEVER: 0
WEAKNESS: 0
HEMATOCHEZIA: 0
SHORTNESS OF BREATH: 0
HEARTBURN: 0
EYE PAIN: 0
PARESTHESIAS: 0

## 2022-12-15 NOTE — PROGRESS NOTES
SUBJECTIVE:   CC: fletcher is an 57 year old who presents for preventative health visit.     Fletcher comes in today for his physical.  He notes erectile dysfunction.  His does not get his for medicine erection as he used to.  He has normal libido however.  His friends tell him he needs testosterone.  His rheumatoid arthritis has been stable.  Kids are good.  2 grandkids are good.  Wife has been doing well after her partial colectomy for stricture.  Father just passed this past week.  He had multiple medical problems including pulmonary fibrosis cancer and heart failure.  Sisters are all doing well.  Work is going well.  He spent 2 weeks in [facility] for his anniversary in November.      Patient has been advised of split billing requirements and indicates understanding: Yes  Healthy Habits:     Getting at least 3 servings of Calcium per day:  Yes    Bi-annual eye exam:  Yes    Dental care twice a year:  NO    Sleep apnea or symptoms of sleep apnea:  Daytime drowsiness and Excessive snoring    Diet:  Regular (no restrictions)    Frequency of exercise:  4-5 days/week    Duration of exercise:  45-60 minutes    Taking medications regularly:  Yes    Medication side effects:  None    PHQ-2 Total Score: 0    Additional concerns today:  No              Today's PHQ-2 Score:   PHQ-2 ( 1999 Pfizer) 12/15/2022   Q1: Little interest or pleasure in doing things 0   Q2: Feeling down, depressed or hopeless 0   PHQ-2 Score 0   Q1: Little interest or pleasure in doing things Not at all   Q2: Feeling down, depressed or hopeless Not at all   PHQ-2 Score 0           Social History     Tobacco Use     Smoking status: Never     Smokeless tobacco: Never   Substance Use Topics     Alcohol use: Not on file     If you drink alcohol do you typically have >3 drinks per day or >7 drinks per week? No    Alcohol Use 12/15/2022   Prescreen: >3 drinks/day or >7 drinks/week? No   Prescreen: >3 drinks/day or >7 drinks/week? -   AUDIT SCORE  -  "      Last PSA:   Prostate Specific Antigen Screen   Date Value Ref Range Status   12/09/2021 1.15 0.00 - 3.50 ug/L Final       Reviewed orders with patient. Reviewed health maintenance and updated orders accordingly - Yes      Reviewed and updated as needed this visit by clinical staff   Tobacco  Allergies  Meds              Reviewed and updated as needed this visit by Provider                     Review of Systems   Constitutional: Negative for chills and fever.   HENT: Negative for congestion, ear pain, hearing loss and sore throat.    Eyes: Negative for pain and visual disturbance.   Respiratory: Negative for cough and shortness of breath.    Cardiovascular: Negative for chest pain, palpitations and peripheral edema.   Gastrointestinal: Negative for abdominal pain, constipation, diarrhea, heartburn, hematochezia and nausea.   Genitourinary: Positive for impotence. Negative for dysuria, frequency, genital sores, hematuria, penile discharge and urgency.   Musculoskeletal: Negative for arthralgias, joint swelling and myalgias.   Skin: Negative for rash.   Neurological: Negative for dizziness, weakness, headaches and paresthesias.   Psychiatric/Behavioral: Negative for mood changes. The patient is not nervous/anxious.          OBJECTIVE:   /80 (BP Location: Left arm, Patient Position: Sitting, Cuff Size: Adult Large)   Pulse 72   Temp 98.2  F (36.8  C)   Ht 1.842 m (6' 0.5\")   Wt 80.3 kg (177 lb)   SpO2 98%   BMI 23.68 kg/m      Physical Exam  GENERAL: healthy, alert and no distress  EYES: Eyes grossly normal to inspection, PERRL and conjunctivae and sclerae normal  HENT: normal cephalic/atraumatic and ear canals and TM's normal  NECK: no adenopathy, no asymmetry, masses, or scars and thyroid normal to palpation  RESP: lungs clear to auscultation - no rales, rhonchi or wheezes  CV: regular rate and rhythm, normal S1 S2, no S3 or S4, no murmur, click or rub, no peripheral edema and peripheral pulses " strong  ABDOMEN: soft, nontender, no hepatosplenomegaly, no masses and bowel sounds normal  MS: no gross musculoskeletal defects noted, no edema  SKIN: no suspicious lesions or rashes  NEURO: Normal strength and tone, mentation intact and speech normal  PSYCH: mentation appears normal, affect normal/bright        ASSESSMENT/PLAN:   1. Routine adult health maintenance  Labs today for monitoring.  He refuses COVID vaccination.  I did recommend shingles vaccination especially given his immunosuppression.  - Testosterone, total; Future  - UA Macro with Reflex to Micro and Culture - lab collect; Future  - PSA, screen; Future  - Lipid panel reflex to direct LDL Fasting; Future  - Comprehensive metabolic panel (BMP + Alb, Alk Phos, ALT, AST, Total. Bili, TP); Future  - CBC with platelets; Future  - CT Coronary Calcium Scan; Future    2. Rheumatoid arthritis involving multiple sites, unspecified whether rheumatoid factor present (H)  Continue close follow-up with his rheumatologist.  Given his RA I do recommend that he undergo a heart scan to evaluate for underlying coronary disease.  - CT Coronary Calcium Scan; Future    3. Primary hypertension  As above.  - CT Coronary Calcium Scan; Future    4. Erectile dysfunction, unspecified erectile dysfunction type  Will check testosterone although his libido is good.  Trial of Viagra.  Side effects etc. discussed.  - Testosterone, total; Future  - sildenafil (VIAGRA) 100 MG tablet; Take 0.5-1 tablets ( mg) by mouth daily as needed (ED)  Dispense: 6 tablet; Refill: 4    Patient has been advised of split billing requirements and indicates understanding: Yes      COUNSELING:   Reviewed preventive health counseling, as reflected in patient instructions        He reports that he has never smoked. He has never used smokeless tobacco.        STEVEN MCKEE MD  [facility]

## 2022-12-18 LAB — TESTOST SERPL-MCNC: 651 NG/DL (ref 240–950)

## 2022-12-20 ENCOUNTER — HOSPITAL ENCOUNTER (OUTPATIENT)
Dept: CT IMAGING | Facility: CLINIC | Age: 57
Discharge: HOME | End: 2022-12-20
Attending: INTERNAL MEDICINE | Admitting: INTERNAL MEDICINE

## 2022-12-20 DIAGNOSIS — I10 PRIMARY HYPERTENSION: ICD-10-CM

## 2022-12-20 DIAGNOSIS — M06.9 RHEUMATOID ARTHRITIS INVOLVING MULTIPLE SITES, UNSPECIFIED WHETHER RHEUMATOID FACTOR PRESENT (H): ICD-10-CM

## 2022-12-20 DIAGNOSIS — Z00.00 ROUTINE ADULT HEALTH MAINTENANCE: ICD-10-CM

## 2022-12-20 PROCEDURE — 75571 CT HRT W/O DYE W/CA TEST: CPT

## 2022-12-20 PROCEDURE — 75571 CT HRT W/O DYE W/CA TEST: CPT | Mod: 26 | Performed by: INTERNAL MEDICINE

## 2023-01-11 ENCOUNTER — TELEPHONE (OUTPATIENT)
Dept: INTERNAL MEDICINE | Facility: CLINIC | Age: 58
End: 2023-01-11

## 2023-01-11 DIAGNOSIS — I10 HYPERTENSION, UNSPECIFIED TYPE: ICD-10-CM

## 2023-01-11 DIAGNOSIS — N52.9 ERECTILE DYSFUNCTION, UNSPECIFIED ERECTILE DYSFUNCTION TYPE: ICD-10-CM

## 2023-01-11 NOTE — TELEPHONE ENCOUNTER
General Call      Reason for Call:  Pt calling regarding Lisinopril 20-12.5mg  Pt is asking if he can please start getting a 90 day supply    Pt insurance does not cover his:  Sildenafil = he is asking if he can get more than 6 tablets monthly?      What are your questions or concerns:  n/a    Date of last appointment with provider: n/a    Could we send this information to you in [employer] or would you prefer to receive a phone call?:   Patient would prefer a phone call   Okay to leave a detailed message?: Yes at Cell number on file:    Telephone Information:   Mobile [phone]

## 2023-01-13 RX ORDER — SILDENAFIL 100 MG/1
50-100 TABLET, FILM COATED ORAL DAILY PRN
Qty: 30 TABLET | Refills: 11 | Status: SHIPPED | OUTPATIENT
Start: 2023-01-13 | End: 2024-01-04

## 2023-01-13 RX ORDER — LISINOPRIL AND HYDROCHLOROTHIAZIDE 12.5; 2 MG/1; MG/1
1 TABLET ORAL DAILY
Qty: 90 TABLET | Refills: 3 | Status: SHIPPED | OUTPATIENT
Start: 2023-01-13 | End: 2024-01-04

## 2023-01-13 NOTE — TELEPHONE ENCOUNTER
Dr. Mayfield,   Are you ok refilling a 90 day supply of lisinopril for this patient?      90 day lisinopril +1 pended.      Called patient and left a VM asking how much Sildenafil patient is requesting and that insurance usually doesn't cover Sildenafil for ED regardless of tablets.

## 2023-01-13 NOTE — TELEPHONE ENCOUNTER
Refilled lis/hydrochlorothiazide and sent 30 viagra with refills to pharm.  If he wants more than that, I will need to change the rx next week.

## 2023-01-13 NOTE — TELEPHONE ENCOUNTER
Patient calling back stating he did discuss with his insurance that a 30-day supply of sildenafil would cost him $45. Informed patient that Dr Rodriguez did sign an order to give him a 30-day supply. Patient verbalized understanding and thanks both Mina and Dr Rodriguez for their quick response. He has no further questions.

## 2023-08-18 ENCOUNTER — TRANSFERRED RECORDS (OUTPATIENT)
Dept: HEALTH INFORMATION MANAGEMENT | Facility: CLINIC | Age: 58
End: 2023-08-18
Payer: COMMERCIAL

## 2023-12-21 ENCOUNTER — OFFICE VISIT (OUTPATIENT)
Dept: INTERNAL MEDICINE | Facility: CLINIC | Age: 58
End: 2023-12-21
Payer: COMMERCIAL

## 2023-12-21 VITALS
SYSTOLIC BLOOD PRESSURE: 134 MMHG | WEIGHT: 166 LBS | HEIGHT: 73 IN | OXYGEN SATURATION: 99 % | TEMPERATURE: 97.8 F | BODY MASS INDEX: 22 KG/M2 | DIASTOLIC BLOOD PRESSURE: 80 MMHG | RESPIRATION RATE: 13 BRPM | HEART RATE: 66 BPM

## 2023-12-21 DIAGNOSIS — I10 PRIMARY HYPERTENSION: ICD-10-CM

## 2023-12-21 DIAGNOSIS — M06.9 RHEUMATOID ARTHRITIS INVOLVING MULTIPLE SITES, UNSPECIFIED WHETHER RHEUMATOID FACTOR PRESENT (H): ICD-10-CM

## 2023-12-21 DIAGNOSIS — Z00.00 ROUTINE ADULT HEALTH MAINTENANCE: Primary | ICD-10-CM

## 2023-12-21 LAB
ALBUMIN SERPL BCG-MCNC: 4.7 G/DL (ref 3.5–5.2)
ALBUMIN UR-MCNC: NEGATIVE MG/DL
ALP SERPL-CCNC: 62 U/L (ref 40–150)
ALT SERPL W P-5'-P-CCNC: 29 U/L (ref 0–70)
ANION GAP SERPL CALCULATED.3IONS-SCNC: 9 MMOL/L (ref 7–15)
APPEARANCE UR: CLEAR
AST SERPL W P-5'-P-CCNC: 36 U/L (ref 0–45)
BILIRUB SERPL-MCNC: 0.9 MG/DL
BILIRUB UR QL STRIP: NEGATIVE
BUN SERPL-MCNC: 12.3 MG/DL (ref 8–23)
CALCIUM SERPL-MCNC: 9.2 MG/DL (ref 8.8–10.2)
CHLORIDE SERPL-SCNC: 99 MMOL/L (ref 98–107)
CHOLEST SERPL-MCNC: 201 MG/DL
COLOR UR AUTO: YELLOW
CREAT SERPL-MCNC: 0.92 MG/DL (ref 0.67–1.17)
DEPRECATED HCO3 PLAS-SCNC: 27 MMOL/L (ref 22–29)
EGFRCR SERPLBLD CKD-EPI 2021: >90 ML/MIN/1.73M2
ERYTHROCYTE [DISTWIDTH] IN BLOOD BY AUTOMATED COUNT: 11.6 % (ref 10–15)
FASTING STATUS PATIENT QL REPORTED: ABNORMAL
GLUCOSE SERPL-MCNC: 104 MG/DL (ref 70–99)
GLUCOSE UR STRIP-MCNC: NEGATIVE MG/DL
HCT VFR BLD AUTO: 42.1 % (ref 40–53)
HDLC SERPL-MCNC: 115 MG/DL
HGB BLD-MCNC: 13.7 G/DL (ref 13.3–17.7)
HGB UR QL STRIP: NEGATIVE
KETONES UR STRIP-MCNC: NEGATIVE MG/DL
LDLC SERPL CALC-MCNC: 78 MG/DL
LEUKOCYTE ESTERASE UR QL STRIP: NEGATIVE
MCH RBC QN AUTO: 30.4 PG (ref 26.5–33)
MCHC RBC AUTO-ENTMCNC: 32.5 G/DL (ref 31.5–36.5)
MCV RBC AUTO: 94 FL (ref 78–100)
NITRATE UR QL: NEGATIVE
NONHDLC SERPL-MCNC: 86 MG/DL
PH UR STRIP: 6 [PH] (ref 5–8)
PLATELET # BLD AUTO: 274 10E3/UL (ref 150–450)
POTASSIUM SERPL-SCNC: 3.9 MMOL/L (ref 3.4–5.3)
PROT SERPL-MCNC: 6.9 G/DL (ref 6.4–8.3)
PSA SERPL DL<=0.01 NG/ML-MCNC: 1.26 NG/ML (ref 0–4.5)
RBC # BLD AUTO: 4.5 10E6/UL (ref 4.4–5.9)
SODIUM SERPL-SCNC: 135 MMOL/L (ref 135–145)
SP GR UR STRIP: 1.01 (ref 1–1.03)
TRIGL SERPL-MCNC: 42 MG/DL
TSH SERPL DL<=0.005 MIU/L-ACNC: 0.97 UIU/ML (ref 0.3–4.2)
UROBILINOGEN UR STRIP-ACNC: 0.2 E.U./DL
WBC # BLD AUTO: 5.7 10E3/UL (ref 4–11)

## 2023-12-21 PROCEDURE — G0103 PSA SCREENING: HCPCS | Performed by: INTERNAL MEDICINE

## 2023-12-21 PROCEDURE — 99396 PREV VISIT EST AGE 40-64: CPT | Performed by: INTERNAL MEDICINE

## 2023-12-21 PROCEDURE — 81003 URINALYSIS AUTO W/O SCOPE: CPT | Performed by: INTERNAL MEDICINE

## 2023-12-21 PROCEDURE — 84443 ASSAY THYROID STIM HORMONE: CPT | Performed by: INTERNAL MEDICINE

## 2023-12-21 PROCEDURE — 80061 LIPID PANEL: CPT | Performed by: INTERNAL MEDICINE

## 2023-12-21 PROCEDURE — 36415 COLL VENOUS BLD VENIPUNCTURE: CPT | Performed by: INTERNAL MEDICINE

## 2023-12-21 PROCEDURE — 99214 OFFICE O/P EST MOD 30 MIN: CPT | Mod: 25 | Performed by: INTERNAL MEDICINE

## 2023-12-21 PROCEDURE — 80053 COMPREHEN METABOLIC PANEL: CPT | Performed by: INTERNAL MEDICINE

## 2023-12-21 PROCEDURE — 85027 COMPLETE CBC AUTOMATED: CPT | Performed by: INTERNAL MEDICINE

## 2023-12-21 ASSESSMENT — ENCOUNTER SYMPTOMS
SORE THROAT: 0
ABDOMINAL PAIN: 0
CHILLS: 0
EYE PAIN: 0
MYALGIAS: 0
DIZZINESS: 0
DIARRHEA: 0
SHORTNESS OF BREATH: 0
HEMATOCHEZIA: 0
PARESTHESIAS: 0
FEVER: 0
ARTHRALGIAS: 0
HEARTBURN: 0
HEADACHES: 0
CONSTIPATION: 0
NERVOUS/ANXIOUS: 0
DYSURIA: 0
HEMATURIA: 0
WEAKNESS: 0
FREQUENCY: 0
COUGH: 0
PALPITATIONS: 0
JOINT SWELLING: 0
NAUSEA: 0

## 2023-12-21 NOTE — PROGRESS NOTES
SUBJECTIVE:   fletcher is a 58 year old, presenting for the following:  Physical (Fasting )        12/21/2023     7:02 AM   Additional Questions   Roomed by Anya   Accompanied by Self       Healthy Habits:     Getting at least 3 servings of Calcium per day:  Yes    Bi-annual eye exam:  Yes    Dental care twice a year:  Yes    Sleep apnea or symptoms of sleep apnea:  None    Diet:  Regular (no restrictions)    Frequency of exercise:  4-5 days/week    Duration of exercise:  45-60 minutes    Taking medications regularly:  Yes    Medication side effects:  None      Today's PHQ-2 Score:       12/21/2023     6:53 AM   PHQ-2 ( 1999 Pfizer)   Q1: Little interest or pleasure in doing things 0   Q2: Feeling down, depressed or hopeless 0   PHQ-2 Score 0   Q1: Little interest or pleasure in doing things Not at all   Q2: Feeling down, depressed or hopeless Not at all   PHQ-2 Score 0                 Fletcher comes in a for physical.  Reports things have been going very well.  He is off rheumatoid medication at this point.  He was diagnosed with rheumatoid arthritis many years by Dr. Garcia.   He had a knee infection at that time and was not feeling well and is unclear if he really truly has rheumatoid arthritis.  I am not even sure if he has positive serology.  He is off of the hydroxychloroquine and feels very good.  He remains extremely active.  He eats very clean and healthy.  Enjoys his grandkids.  3 sons.  1 is still in [place].  May be coming back.  Wife is doing well.  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  3 sisters are doing well.  Still sees and helps his stepmother Fely out.      Social History     Tobacco Use    Smoking status: Never    Smokeless tobacco: Never   Substance Use Topics    Alcohol use: Not on file             12/21/2023     6:53 AM   Alcohol Use   Prescreen: >3 drinks/day or >7 drinks/week? Yes   AUDIT SCORE  4       Last PSA:   Prostate Specific  "Antigen Screen   Date Value Ref Range Status   12/15/2022 1.23 0.00 - 3.50 ng/mL Final   12/09/2021 1.15 0.00 - 3.50 ug/L Final       Reviewed orders with patient. Reviewed health maintenance and updated orders accordingly -       Reviewed and updated as needed this visit by clinical staff   Tobacco  Allergies  Meds              Reviewed and updated as needed this visit by Provider                     Review of Systems   Constitutional:  Negative for chills and fever.   HENT:  Negative for congestion, ear pain, hearing loss and sore throat.    Eyes:  Negative for pain and visual disturbance.   Respiratory:  Negative for cough and shortness of breath.    Cardiovascular:  Negative for chest pain, palpitations and peripheral edema.   Gastrointestinal:  Negative for abdominal pain, constipation, diarrhea, heartburn, hematochezia and nausea.   Genitourinary:  Negative for dysuria, frequency, genital sores, hematuria, impotence, penile discharge and urgency.   Musculoskeletal:  Negative for arthralgias, joint swelling and myalgias.   Skin:  Negative for rash.   Neurological:  Negative for dizziness, weakness, headaches and paresthesias.   Psychiatric/Behavioral:  Negative for mood changes. The patient is not nervous/anxious.          OBJECTIVE:   /80 (BP Location: Left arm, Patient Position: Sitting, Cuff Size: Adult Small)   Pulse 66   Temp 97.8  F (36.6  C) (Tympanic)   Resp 13   Ht 1.842 m (6' 0.5\")   Wt 75.3 kg (166 lb)   SpO2 99%   BMI 22.20 kg/m      Physical Exam  GENERAL: healthy, alert and no distress  EYES: Eyes grossly normal to inspection, PERRL and conjunctivae and sclerae normal  HENT: ear canals and TM's normal, nose and mouth without ulcers or lesions  NECK: no adenopathy, no asymmetry, masses, or scars and thyroid normal to palpation  RESP: lungs clear to auscultation - no rales, rhonchi or wheezes  CV: regular rate and rhythm, normal S1 S2, no S3 or S4, no murmur, click or rub, no " peripheral edema and peripheral pulses strong  ABDOMEN: soft, nontender, no hepatosplenomegaly, no masses and bowel sounds normal  MS: Deformed left ring finger.  Ulnar deviation  SKIN: no suspicious lesions or rashes.  Marked varicosities lower extremities.  NEURO: Normal strength and tone, mentation intact and speech normal  PSYCH: mentation appears normal, affect normal/bright        ASSESSMENT/PLAN:   1. Routine adult health maintenance  He is up-to-date with his immunizations except for flu shot.  He wants to hold off until after the holidays which I somewhat discouraged but he does not want to feels sick for Nate.    2. Rheumatoid arthritis involving multiple sites, unspecified whether rheumatoid factor present (H)  Continue close follow-up with his rheumatologist.  He has no stigmata of rheumatoid arthritis.  X-rays have not shown any evidence of the joint destruction of his hands.  He does have a deformity of his left ring finger due to previous trauma.    3. Primary hypertension  Blood pressure looks good today.    Do note that he is lost some weight.  He has been trying to do that.  He was at 200 pounds at 1 point and did not like that weight.  He slowly has come down over the years and he feels he is at a good weight.  He is not trying to lose weight necessarily at this point I have asked that he monitor it and if he continues to lose weight unexpectedly he should let me know.  He does feel very well and looks well.          COUNSELING:   Reviewed preventive health counseling, as reflected in patient instructions        He reports that he has never smoked. He has never used smokeless tobacco.            STEVEN MCKEE MD  [facility]

## 2023-12-21 NOTE — COMMUNITY RESOURCES LIST (ENGLISH)
12/21/2023   [facility]  N/A  For questions about this resource list or additional care needs, please contact your primary care clinic or care manager.  Phone: [phone]   Email: N/A   Address: [facility] [place] [address]   Hours: N/A        Hotlines and Helplines       Hotline - Housing crisis  1  [facility] Distance: 19.17 miles      Phone/Virtual   [address] [place] [address]  Language: English  Hours: Mon - Sun Open 24 Hours   Phone: (918) 836-8340 Email: communications@[facility].org Website: https://[facility]-[place].org/oursaviourshousing/     2  [facility] Distance: 20.36 miles      Phone/Virtual   [address] [place] [address]  Language: English  Hours: Mon - Sun Open 24 Hours   Phone: (269) 829-4232 Email: info@[facility] Website: http://www.[facility].org          Housing       Coordinated Entry access point  3  [facility] Distance: 11.83 miles      Phone/Virtual   [address] [address]  Language: English  Hours: Mon - Fri 8:30 AM - 12:00 PM , Mon - Fri 1:00 PM - 4:00 PM  Fees: Free   Phone: (561) 665-3255 Ext.2 Email: brian@[facility].org Website: https://www.[facility].org/usn/     4  [facility] Distance: 16.04 miles      In-Person, Phone/Virtual   [address] [place] [address]  Language: English  Hours: Mon - Fri 8:30 AM - 4:30 PM  Fees: Free   Phone: (967) 613-3537 Email: info@[facility]ndepict.org Website: https://www.[facility].org/[facility]/     Drop-in center or day shelter  5  [facility] Distance: 14.88 miles      In-Person   [address] [place] [address]  Language: English  Hours: Mon - Fri 9:00 AM - 4:00 PM  Fees: Free   Phone: (227) 672-3694 Email: ambika@[employer].org Website: http://[facility].org     6  [facility]  Distance: 18.72 miles      In-Person   [address] [place] [address]  Language: English, Hmong, Monegasque, Macedonian  Hours: Mon - Thu 8:30 AM - 4:30 PM , Sat - Sun 9:00 AM - 12:00 PM  Fees: Free   Phone: (754) 803-8148 Email: info@[employer] Website: https://[employer]o/     Housing search assistance  7  [facility] - Homeless Resources and Housing Information - Housing search assistance Distance: 4.93 miles      In-Person, Phone/Virtual   [address] [place] [address]  Language: English  Hours: Mon - Fri 8:00 AM - 4:30 PM  Fees: Free   Phone: (973) 585-6045 Email: [employer]wandyCorthera@[facility]. Website: https://www.[facility]/Facilities/Facility/Details/23     8  [facility] - Homeless Resources and Housing Information - Housing search assistance Distance: 13.28 miles      In-Person, Phone/Virtual   [address] [place] [address]  Language: English  Hours: Mon - Fri 8:00 AM - 4:30 PM  Fees: Free   Phone: (564) 685-2338 Email: Navini NetworksmigdaliaSymvato@[facility]. Website: https://www.[facility]./469/Community-Services     Shelter for families  9  [facility] Distance: 0.86 miles      In-Person   [address] [place] [address]  Language: English  Hours: Mon - Fri 3:00 PM - 9:00 AM , Sat - Sun Open 24 Hours  Fees: Free   Phone: (981) 163-4212 Ext.1 Website: https://www.[facility].org/2020/07/03/emergency-family-shelter/     10  [facility] Distance: 22.62 miles      In-Person   [address] [place] [address]  Language: English  Hours: Mon - Sun Open 24 Hours  Fees: Free, Self Pay   Phone: (674) 546-3918 Email: Kahlil@[facility].org Website: http://www.sagraceplace.org/     Shelter for individuals  11  [facility] - Homeless Resources and Housing Information -  Emergency housing Distance: 4.93 miles      In-Person, Phone/Virtual   [address] [place] [address]  Language: English  Hours: Mon - Fri 8:00 AM - 4:30 PM  Fees: Free   Phone: (671) 535-7410 Email: melchorcomlorinwandyalycia@[facility]. Website: https://www.[facility]/Facilities/Facility/Details/23 12  [facility] - Homeless Resources and Housing Information - Emergency housing Distance: 13.28 miles      In-Person, Phone/Virtual   [address] [place] [address]  Language: English  Hours: Mon - Fri 8:00 AM - 4:30 PM  Fees: Free   Phone: (199) 682-5361 Email: glorialorinwandyalycia@[facility]. Website: https://www.[facility]/469/Community-Services          Important Numbers & Websites       Emergency Services   911  [facility]   311  Poison Control   (692) 321-9140  Suicide Prevention Lifeline   (687) 814-2191 (TALK)  Child Abuse Hotline   (934) 373-9578 (4-A-Child)  Sexual Assault Hotline   (370) 387-6272 (HOPE)  National Runaway Safeline   (612) 897-4136 (RUNAWAY)  All-Options Talkline   (187) 567-6091  Substance Abuse Referral   (480) 343-2143 (HELP)

## 2024-01-03 DIAGNOSIS — N52.9 ERECTILE DYSFUNCTION, UNSPECIFIED ERECTILE DYSFUNCTION TYPE: ICD-10-CM

## 2024-01-03 DIAGNOSIS — I10 HYPERTENSION, UNSPECIFIED TYPE: ICD-10-CM

## 2024-01-04 RX ORDER — SILDENAFIL 100 MG/1
TABLET, FILM COATED ORAL
Qty: 30 TABLET | Refills: 1 | Status: SHIPPED | OUTPATIENT
Start: 2024-01-04 | End: 2024-03-25

## 2024-01-04 RX ORDER — LISINOPRIL AND HYDROCHLOROTHIAZIDE 12.5; 2 MG/1; MG/1
1 TABLET ORAL DAILY
Qty: 90 TABLET | Refills: 1 | Status: SHIPPED | OUTPATIENT
Start: 2024-01-04 | End: 2024-06-28

## 2024-01-29 ENCOUNTER — TRANSFERRED RECORDS (OUTPATIENT)
Dept: HEALTH INFORMATION MANAGEMENT | Facility: CLINIC | Age: 59
End: 2024-01-29
Payer: COMMERCIAL

## 2024-02-05 ENCOUNTER — TRANSFERRED RECORDS (OUTPATIENT)
Dept: HEALTH INFORMATION MANAGEMENT | Facility: CLINIC | Age: 59
End: 2024-02-05
Payer: COMMERCIAL

## 2024-02-11 DIAGNOSIS — N52.9 ERECTILE DYSFUNCTION, UNSPECIFIED ERECTILE DYSFUNCTION TYPE: ICD-10-CM

## 2024-02-12 RX ORDER — SILDENAFIL 100 MG/1
TABLET, FILM COATED ORAL
Qty: 30 TABLET | Refills: 0 | OUTPATIENT
Start: 2024-02-12

## 2024-03-22 DIAGNOSIS — N52.9 ERECTILE DYSFUNCTION, UNSPECIFIED ERECTILE DYSFUNCTION TYPE: ICD-10-CM

## 2024-03-25 RX ORDER — SILDENAFIL 100 MG/1
TABLET, FILM COATED ORAL
Qty: 30 TABLET | Refills: 0 | Status: SHIPPED | OUTPATIENT
Start: 2024-03-25 | End: 2024-05-03

## 2024-05-03 DIAGNOSIS — N52.9 ERECTILE DYSFUNCTION, UNSPECIFIED ERECTILE DYSFUNCTION TYPE: ICD-10-CM

## 2024-05-03 RX ORDER — SILDENAFIL 100 MG/1
TABLET, FILM COATED ORAL
Qty: 30 TABLET | Refills: 1 | Status: SHIPPED | OUTPATIENT
Start: 2024-05-03 | End: 2024-06-29

## 2024-06-28 DIAGNOSIS — I10 HYPERTENSION, UNSPECIFIED TYPE: ICD-10-CM

## 2024-06-28 RX ORDER — LISINOPRIL AND HYDROCHLOROTHIAZIDE 12.5; 2 MG/1; MG/1
1 TABLET ORAL DAILY
Qty: 90 TABLET | Refills: 0 | Status: SHIPPED | OUTPATIENT
Start: 2024-06-28 | End: 2024-09-20

## 2024-06-29 ENCOUNTER — MYC REFILL (OUTPATIENT)
Dept: INTERNAL MEDICINE | Facility: CLINIC | Age: 59
End: 2024-06-29
Payer: COMMERCIAL

## 2024-06-29 DIAGNOSIS — N52.9 ERECTILE DYSFUNCTION, UNSPECIFIED ERECTILE DYSFUNCTION TYPE: ICD-10-CM

## 2024-07-01 RX ORDER — SILDENAFIL 100 MG/1
TABLET, FILM COATED ORAL
Qty: 30 TABLET | Refills: 1 | Status: SHIPPED | OUTPATIENT
Start: 2024-07-01 | End: 2024-08-22

## 2024-08-22 DIAGNOSIS — N52.9 ERECTILE DYSFUNCTION, UNSPECIFIED ERECTILE DYSFUNCTION TYPE: ICD-10-CM

## 2024-08-22 RX ORDER — SILDENAFIL 100 MG/1
TABLET, FILM COATED ORAL
Qty: 30 TABLET | Refills: 1 | Status: SHIPPED | OUTPATIENT
Start: 2024-08-22

## 2024-09-20 DIAGNOSIS — I10 HYPERTENSION, UNSPECIFIED TYPE: ICD-10-CM

## 2024-09-20 RX ORDER — LISINOPRIL AND HYDROCHLOROTHIAZIDE 12.5; 2 MG/1; MG/1
1 TABLET ORAL DAILY
Qty: 90 TABLET | Refills: 0 | Status: SHIPPED | OUTPATIENT
Start: 2024-09-20

## 2024-11-21 ENCOUNTER — PATIENT OUTREACH (OUTPATIENT)
Dept: CARE COORDINATION | Facility: CLINIC | Age: 59
End: 2024-11-21
Payer: COMMERCIAL

## 2024-12-05 ENCOUNTER — PATIENT OUTREACH (OUTPATIENT)
Dept: CARE COORDINATION | Facility: CLINIC | Age: 59
End: 2024-12-05
Payer: COMMERCIAL

## 2024-12-16 DIAGNOSIS — I10 HYPERTENSION, UNSPECIFIED TYPE: ICD-10-CM

## 2024-12-16 RX ORDER — LISINOPRIL AND HYDROCHLOROTHIAZIDE 12.5; 2 MG/1; MG/1
1 TABLET ORAL DAILY
Qty: 90 TABLET | Refills: 0 | Status: SHIPPED | OUTPATIENT
Start: 2024-12-16

## 2025-01-31 ENCOUNTER — ANESTHESIA EVENT (OUTPATIENT)
Dept: SURGERY | Facility: AMBULATORY SURGERY CENTER | Age: 60
End: 2025-01-31
Payer: COMMERCIAL

## 2025-01-31 RX ORDER — ONDANSETRON 4 MG/1
TABLET, ORALLY DISINTEGRATING ORAL
COMMUNITY
Start: 2024-11-04

## 2025-02-03 ENCOUNTER — ANESTHESIA (OUTPATIENT)
Dept: SURGERY | Facility: AMBULATORY SURGERY CENTER | Age: 60
End: 2025-02-03
Payer: COMMERCIAL

## 2025-02-03 ENCOUNTER — HOSPITAL ENCOUNTER (OUTPATIENT)
Facility: AMBULATORY SURGERY CENTER | Age: 60
Discharge: HOME | End: 2025-02-03
Attending: COLON & RECTAL SURGERY
Payer: COMMERCIAL

## 2025-02-03 VITALS
OXYGEN SATURATION: 94 % | BODY MASS INDEX: 22.53 KG/M2 | HEART RATE: 62 BPM | TEMPERATURE: 97 F | SYSTOLIC BLOOD PRESSURE: 114 MMHG | RESPIRATION RATE: 18 BRPM | DIASTOLIC BLOOD PRESSURE: 60 MMHG | WEIGHT: 170 LBS | HEIGHT: 73 IN

## 2025-02-03 LAB — COLONOSCOPY: NORMAL

## 2025-02-03 RX ORDER — PROPOFOL 10 MG/ML
INJECTION, EMULSION INTRAVENOUS CONTINUOUS PRN
Status: DISCONTINUED | OUTPATIENT
Start: 2025-02-03 | End: 2025-02-03

## 2025-02-03 RX ORDER — OXYCODONE HYDROCHLORIDE 10 MG/1
10 TABLET ORAL
Status: DISCONTINUED | OUTPATIENT
Start: 2025-02-03 | End: 2025-02-04 | Stop reason: HOSPADM

## 2025-02-03 RX ORDER — DEXAMETHASONE SODIUM PHOSPHATE 4 MG/ML
4 INJECTION, SOLUTION INTRA-ARTICULAR; INTRALESIONAL; INTRAMUSCULAR; INTRAVENOUS; SOFT TISSUE
Status: DISCONTINUED | OUTPATIENT
Start: 2025-02-03 | End: 2025-02-04 | Stop reason: HOSPADM

## 2025-02-03 RX ORDER — ONDANSETRON 2 MG/ML
4 INJECTION INTRAMUSCULAR; INTRAVENOUS EVERY 30 MIN PRN
Status: DISCONTINUED | OUTPATIENT
Start: 2025-02-03 | End: 2025-02-04 | Stop reason: HOSPADM

## 2025-02-03 RX ORDER — NALOXONE HYDROCHLORIDE 0.4 MG/ML
0.1 INJECTION, SOLUTION INTRAMUSCULAR; INTRAVENOUS; SUBCUTANEOUS
Status: DISCONTINUED | OUTPATIENT
Start: 2025-02-03 | End: 2025-02-04 | Stop reason: HOSPADM

## 2025-02-03 RX ORDER — OXYCODONE HYDROCHLORIDE 5 MG/1
5 TABLET ORAL
Status: DISCONTINUED | OUTPATIENT
Start: 2025-02-03 | End: 2025-02-04 | Stop reason: HOSPADM

## 2025-02-03 RX ORDER — PROPOFOL 10 MG/ML
INJECTION, EMULSION INTRAVENOUS PRN
Status: DISCONTINUED | OUTPATIENT
Start: 2025-02-03 | End: 2025-02-03

## 2025-02-03 RX ORDER — LIDOCAINE 40 MG/G
CREAM TOPICAL
Status: DISCONTINUED | OUTPATIENT
Start: 2025-02-03 | End: 2025-02-04 | Stop reason: HOSPADM

## 2025-02-03 RX ORDER — LIDOCAINE HYDROCHLORIDE 20 MG/ML
INJECTION, SOLUTION INFILTRATION; PERINEURAL PRN
Status: DISCONTINUED | OUTPATIENT
Start: 2025-02-03 | End: 2025-02-03

## 2025-02-03 RX ORDER — GLYCOPYRROLATE 0.2 MG/ML
INJECTION, SOLUTION INTRAMUSCULAR; INTRAVENOUS PRN
Status: DISCONTINUED | OUTPATIENT
Start: 2025-02-03 | End: 2025-02-03

## 2025-02-03 RX ORDER — ONDANSETRON 4 MG/1
4 TABLET, ORALLY DISINTEGRATING ORAL EVERY 30 MIN PRN
Status: DISCONTINUED | OUTPATIENT
Start: 2025-02-03 | End: 2025-02-04 | Stop reason: HOSPADM

## 2025-02-03 RX ORDER — SODIUM CHLORIDE, SODIUM LACTATE, POTASSIUM CHLORIDE, CALCIUM CHLORIDE 600; 310; 30; 20 MG/100ML; MG/100ML; MG/100ML; MG/100ML
INJECTION, SOLUTION INTRAVENOUS CONTINUOUS
Status: DISCONTINUED | OUTPATIENT
Start: 2025-02-03 | End: 2025-02-04 | Stop reason: HOSPADM

## 2025-02-03 RX ADMIN — PROPOFOL 200 MCG/KG/MIN: 10 INJECTION, EMULSION INTRAVENOUS at 07:34

## 2025-02-03 RX ADMIN — PROPOFOL 50 MG: 10 INJECTION, EMULSION INTRAVENOUS at 07:50

## 2025-02-03 RX ADMIN — PROPOFOL 50 MG: 10 INJECTION, EMULSION INTRAVENOUS at 07:40

## 2025-02-03 RX ADMIN — PROPOFOL 100 MG: 10 INJECTION, EMULSION INTRAVENOUS at 07:34

## 2025-02-03 RX ADMIN — GLYCOPYRROLATE 0.2 MG: 0.2 INJECTION, SOLUTION INTRAMUSCULAR; INTRAVENOUS at 07:55

## 2025-02-03 RX ADMIN — PROPOFOL 50 MG: 10 INJECTION, EMULSION INTRAVENOUS at 07:36

## 2025-02-03 RX ADMIN — LIDOCAINE HYDROCHLORIDE 60 MG: 20 INJECTION, SOLUTION INFILTRATION; PERINEURAL at 07:33

## 2025-02-03 RX ADMIN — PROPOFOL 50 MG: 10 INJECTION, EMULSION INTRAVENOUS at 07:46

## 2025-02-03 RX ADMIN — SODIUM CHLORIDE, SODIUM LACTATE, POTASSIUM CHLORIDE, CALCIUM CHLORIDE: 600; 310; 30; 20 INJECTION, SOLUTION INTRAVENOUS at 07:02

## 2025-02-03 RX ADMIN — PROPOFOL 50 MG: 10 INJECTION, EMULSION INTRAVENOUS at 07:44

## 2025-02-03 RX ADMIN — PROPOFOL 50 MG: 10 INJECTION, EMULSION INTRAVENOUS at 07:42

## 2025-02-03 RX ADMIN — GLYCOPYRROLATE 0.2 MG: 0.2 INJECTION, SOLUTION INTRAMUSCULAR; INTRAVENOUS at 07:33

## 2025-02-03 NOTE — H&P
Colon & Rectal Surgery Pre-procedure H&P    2/3/2025     Primary Care Physician     Chief Complaint/Reason for Procedure:             screening    History and Physical:   Mao Larson is a 59 year old male presenting for colonoscopy for screening purposes.       Past Medical History   I have reviewed this patient's medical history and updated it with pertinent information if needed.   Past Medical History:   Diagnosis Date    Hypertension     IVCD (intraventricular conduction defect)     .ll2 qrs    Mumps orchitis     atrophic left testicle    Rheumatoid arthritis (H) 01/01/2014    Tremor     childhood       Past Surgical History   I have reviewed this patient's surgical history and updated it with pertinent information if needed.  Past Surgical History:   Procedure Laterality Date    ARTHROSCOPY KNEE Left     ARTHROSCOPY KNEE Left 12/01/2013    Repeat surgery    COLONOSCOPY      FINGER SURGERY Left     HERNIA REPAIR, UMBILICAL      INGUINAL HERNIA REPAIR Left     TONSILLECTOMY         Allergies:  No Known Allergies      Prior to Admission Medications   Cannot display prior to admission medications because the patient has not been admitted in this contact.     Allergies   No Known Allergies    Social History   I have reviewed this patient's social history and updated it with pertinent information if needed. Mao Larson  reports that he has never smoked. He has never used smokeless tobacco. He reports that he does not currently use alcohol. He reports that he does not currently use drugs.    Family History   I have reviewed this patient's family history and updated it with pertinent information if needed.   Family History   Problem Relation Age of Onset    Colon Cancer Mother     Heart Disease Father         Ischemic    Hypertension Father     Hyperlipidemia Father     Colon Polyps Sister        Review of Systems   The 10 point Review of Systems is negative other than noted in the HPI or here.      Physical  Exam   Temp: 97.2  F (36.2  C) Temp src: Temporal BP: (!) 152/79 Pulse: 62   Resp: 16 SpO2: 99 % O2 Device: None (Room air)    Vital Signs with Ranges  Temp:  [97.2  F (36.2  C)] 97.2  F (36.2  C)  Pulse:  [62] 62  Resp:  [16] 16  BP: (152)/(79) 152/79  SpO2:  [99 %] 99 %  170 lbs 0 oz    Aaox3, nad  Lungs clear B  RRR    Data         Assessment/Plan:  Mao Larson presents for colonoscopy. Risks and benefits of colonoscopy discussed in detail and informed consent obtained.      - proceed with colonoscopy    Chiara Mace MD, MS  [facility]  Office: [phone]  2/3/2025 7:22 AM

## 2025-02-03 NOTE — ANESTHESIA CARE TRANSFER NOTE
Patient: Mao Larson    Procedure: Procedure(s):  COLONOSCOPY       Diagnosis: Encounter for screening for colorectal cancer in high risk patient [Z12.11, Z91.89, Z12.12]  Diagnosis Additional Information: No value filed.    Anesthesia Type:   MAC     Note:    Oropharynx: oropharynx clear of all foreign objects and spontaneously breathing  Level of Consciousness: awake  Oxygen Supplementation: room air    Independent Airway: airway patency satisfactory and stable  Dentition: dentition unchanged  Vital Signs Stable: post-procedure vital signs reviewed and stable  Report to RN Given: handoff report given  Patient transferred to: Phase II    Handoff Report: Identifed the Patient, Identified the Reponsible Provider, Reviewed the pertinent medical history, Discussed the surgical course, Reviewed Intra-OP anesthesia mangement and issues during anesthesia, Set expectations for post-procedure period and Allowed opportunity for questions and acknowledgement of understanding      Vitals:  Vitals Value Taken Time   /60 02/03/25 0812   Temp 97  F (36.1  C) 02/03/25 0812   Pulse 73 02/03/25 0812   Resp 16 02/03/25 0812   SpO2 98 % 02/03/25 0812       Electronically Signed By: RENETTA Correa CRNA  February 3, 2025  8:14 AM

## 2025-02-03 NOTE — ANESTHESIA PREPROCEDURE EVALUATION
Anesthesia Pre-Procedure Evaluation    Patient: Mao Larson   MRN: [id number] : 1965        Procedure : Procedure(s):  COLONOSCOPY          Past Medical History:   Diagnosis Date     Hypertension      IVCD (intraventricular conduction defect)     .ll2 qrs     Mumps orchitis     atrophic left testicle     Rheumatoid arthritis (H) 2014     Tremor     childhood      Past Surgical History:   Procedure Laterality Date     ARTHROSCOPY KNEE Left      ARTHROSCOPY KNEE Left 2013    Repeat surgery     COLONOSCOPY       FINGER SURGERY Left      HERNIA REPAIR, UMBILICAL       INGUINAL HERNIA REPAIR Left      TONSILLECTOMY        No Known Allergies   Social History     Tobacco Use     Smoking status: Never     Smokeless tobacco: Never   Substance Use Topics     Alcohol use: Not Currently      Wt Readings from Last 1 Encounters:   25 77.1 kg (170 lb)        Anesthesia Evaluation            ROS/MED HX  ENT/Pulmonary:  - neg pulmonary ROS     Neurologic:  - neg neurologic ROS     Cardiovascular:     (+)  hypertension- -   -  - -                                      METS/Exercise Tolerance:     Hematologic:  - neg hematologic  ROS     Musculoskeletal: Comment: RA      GI/Hepatic:  - neg GI/hepatic ROS     Renal/Genitourinary:  - neg Renal ROS     Endo:  - neg endo ROS     Psychiatric/Substance Use:  - neg psychiatric ROS     Infectious Disease:  - neg infectious disease ROS     Malignancy:  - neg malignancy ROS     Other:  - neg other ROS        Physical Exam    Airway  airway exam normal      Mallampati: II       Respiratory Devices and Support         Dental  no notable dental history         Cardiovascular   cardiovascular exam normal       Rhythm and rate: regular and normal     Pulmonary   pulmonary exam normal        breath sounds clear to auscultation       OUTSIDE LABS:  CBC:   Lab Results   Component Value Date    WBC 5.7 2023    WBC 4.5 12/15/2022    HGB 13.7 2023    HGB 14.8  "12/15/2022    HCT 42.1 12/21/2023    HCT 45.3 12/15/2022     12/21/2023     12/15/2022     BMP:   Lab Results   Component Value Date     12/21/2023     12/15/2022    POTASSIUM 3.9 12/21/2023    POTASSIUM 4.2 12/15/2022    CHLORIDE 99 12/21/2023    CHLORIDE 102 12/15/2022    CO2 27 12/21/2023    CO2 27 12/15/2022    BUN 12.3 12/21/2023    BUN 16.0 12/15/2022    CR 0.92 12/21/2023    CR 1.05 12/15/2022     (H) 12/21/2023    GLC 93 12/15/2022     COAGS: No results found for: \"PTT\", \"INR\", \"FIBR\"  POC: No results found for: \"BGM\", \"HCG\", \"HCGS\"  HEPATIC:   Lab Results   Component Value Date    ALBUMIN 4.7 12/21/2023    PROTTOTAL 6.9 12/21/2023    ALT 29 12/21/2023    AST 36 12/21/2023    ALKPHOS 62 12/21/2023    BILITOTAL 0.9 12/21/2023     OTHER:   Lab Results   Component Value Date    FIDELINA 9.2 12/21/2023    PHOS 3.1 08/31/2018    TSH 0.97 12/21/2023    CRP <0.1 05/10/2019    SED 2 05/10/2019       Anesthesia Plan    ASA Status:  2       Anesthesia Type: MAC.   Induction: Intravenous, Propofol.   Maintenance: TIVA.        Consents    Anesthesia Plan(s) and associated risks, benefits, and realistic alternatives discussed. Questions answered and patient/representative(s) expressed understanding.     - Discussed:     - Discussed with:  Patient      - Extended Intubation/Ventilatory Support Discussed: No.      - Patient is DNR/DNI Status: No     Use of blood products discussed: No .     Postoperative Care    Pain management: IV analgesics.   PONV prophylaxis: Ondansetron (or other 5HT-3), Dexamethasone or Solumedrol     Comments:    Other Comments: Propofol infusion         Jose Ward MD    I have reviewed the pertinent notes and labs in the chart from the past 30 days and (re)examined the patient.  Any updates or changes from those notes are reflected in this note.    Clinically Significant Risk Factors Present on Admission                   # Hypertension: Noted on problem list    "

## 2025-02-03 NOTE — DISCHARGE INSTRUCTIONS
Discharge Instructions:    Take you medications as directed and follow up with you primary provider as scheduled.   You should expect to pass air from your rectum after the procedure.   Follow these care guidelines during your recovery for the next 24 hours.   If you have any questions or concerns please contact the provider that performed your procedure.     You were given medicine for sedation:  You have been given medicines during your procedure that might make you sleepy and weak. To prevent problems:    *Rest for the rest of the day after you are home. You should be back to your normal activity tomorrow.  *For the next 24 hours:   -Do not drink alcoholic beverages.   -Do not make any important decisions or sign any legal forms.   -Do not work around machinery or power equipment.    -do not drive for 24 hours     The medicines used for sedation may make you feel nauseated.   *Start with clear liquids, such as tea, jell-o, broth and ginger ale. As you feel better you may add soft foods such as pudding and ice cream.   *When you no longer feel nauseated, you may try your normal diet.     You should be back to eating your normal after 24 hours.     Call if you have any of these problems:  *Fever of 101 degree F or 38 degrees C  *Bleeding from the rectum  *Black stool or blood in your bowel movements  *Nausea with vomiting that does not ease after a few hours.  *Abdominal pain or bloating  *Fainting

## 2025-02-03 NOTE — ANESTHESIA POSTPROCEDURE EVALUATION
Patient: Mao Larson    Procedure: Procedure(s):  COLONOSCOPY       Anesthesia Type:  MAC    Note:  Disposition: Outpatient   Postop Pain Control: Uneventful            Sign Out: Well controlled pain   PONV: No   Neuro/Psych: Uneventful            Sign Out: Acceptable/Baseline neuro status   Airway/Respiratory: Uneventful            Sign Out: Acceptable/Baseline resp. status   CV/Hemodynamics: Uneventful            Sign Out: Acceptable CV status; No obvious hypovolemia; No obvious fluid overload   Other NRE:    DID A NON-ROUTINE EVENT OCCUR?        Last vitals:  Vitals Value Taken Time   /60 02/03/25 0813   Temp 97  F (36.1  C) 02/03/25 0812   Pulse 62 02/03/25 0830   Resp 18 02/03/25 0830   SpO2 94 % 02/03/25 0830   Vitals shown include unfiled device data.    Electronically Signed By: Jose Ward MD  February 3, 2025  10:45 AM

## 2025-03-04 DIAGNOSIS — I10 HYPERTENSION, UNSPECIFIED TYPE: ICD-10-CM

## 2025-03-04 RX ORDER — LISINOPRIL AND HYDROCHLOROTHIAZIDE 12.5; 2 MG/1; MG/1
TABLET ORAL
Qty: 90 TABLET | Refills: 0 | Status: SHIPPED | OUTPATIENT
Start: 2025-03-04

## 2025-03-26 ENCOUNTER — DOCUMENTATION ONLY (OUTPATIENT)
Dept: INTERNAL MEDICINE | Facility: CLINIC | Age: 60
End: 2025-03-26
Payer: COMMERCIAL

## 2025-03-26 NOTE — PROGRESS NOTES
Mao IZABEL Larson has an upcoming lab appointment:    Future Appointments   Date Time Provider Department Center   4/4/2025  7:30 AM [facility]   4/9/2025  3:40 PM [facility]     There is no order available. Please review and place either future orders or HMPO (Review of Health Maintenance Protocol Orders), as appropriate.    Health Maintenance Due   Topic    BMP     ANNUAL REVIEW OF HM ORDERS      Marisol Hampton

## 2025-03-27 NOTE — TELEPHONE ENCOUNTER
03/27 I called and left message that Dr. Rodriguez will need to see him first before ordering any lab work can always schedule a fasting labs after appt

## 2025-03-27 NOTE — PROGRESS NOTES
Patient's last Physical Appt with PCP was on 12/21/23. Patient does have an upcoming appt scheduled for 4/9/25 with provider.     Provider will not know what type of labs are needed prior to scheduled visit. Please have patient fast for his upcoming appt on 4/9/25 or we can schedule him a fasting lab appointment after physical appt, thank you.

## 2025-04-09 ENCOUNTER — OFFICE VISIT (OUTPATIENT)
Dept: INTERNAL MEDICINE | Facility: CLINIC | Age: 60
End: 2025-04-09
Payer: COMMERCIAL

## 2025-04-09 VITALS
DIASTOLIC BLOOD PRESSURE: 85 MMHG | TEMPERATURE: 98.4 F | SYSTOLIC BLOOD PRESSURE: 156 MMHG | HEART RATE: 60 BPM | WEIGHT: 179.4 LBS | HEIGHT: 72 IN | RESPIRATION RATE: 19 BRPM | BODY MASS INDEX: 24.3 KG/M2 | OXYGEN SATURATION: 99 %

## 2025-04-09 DIAGNOSIS — Z12.5 SCREENING PSA (PROSTATE SPECIFIC ANTIGEN): ICD-10-CM

## 2025-04-09 DIAGNOSIS — Z00.00 ROUTINE GENERAL MEDICAL EXAMINATION AT A HEALTH CARE FACILITY: Primary | ICD-10-CM

## 2025-04-09 DIAGNOSIS — I10 PRIMARY HYPERTENSION: Primary | ICD-10-CM

## 2025-04-09 DIAGNOSIS — Z87.39 HISTORY OF RHEUMATOID ARTHRITIS: ICD-10-CM

## 2025-04-09 DIAGNOSIS — I10 PRIMARY HYPERTENSION: ICD-10-CM

## 2025-04-09 LAB
ALBUMIN UR-MCNC: NEGATIVE MG/DL
APPEARANCE UR: CLEAR
BILIRUB UR QL STRIP: NEGATIVE
COLOR UR AUTO: YELLOW
GLUCOSE UR STRIP-MCNC: NEGATIVE MG/DL
HGB UR QL STRIP: NEGATIVE
KETONES UR STRIP-MCNC: NEGATIVE MG/DL
LEUKOCYTE ESTERASE UR QL STRIP: NEGATIVE
NITRATE UR QL: NEGATIVE
PH UR STRIP: 6.5 [PH] (ref 5–8)
SP GR UR STRIP: 1.01 (ref 1–1.03)
UROBILINOGEN UR STRIP-ACNC: 0.2 E.U./DL

## 2025-04-09 PROCEDURE — 1126F AMNT PAIN NOTED NONE PRSNT: CPT | Performed by: INTERNAL MEDICINE

## 2025-04-09 PROCEDURE — 3078F DIAST BP <80 MM HG: CPT | Performed by: INTERNAL MEDICINE

## 2025-04-09 PROCEDURE — 90471 IMMUNIZATION ADMIN: CPT | Performed by: INTERNAL MEDICINE

## 2025-04-09 PROCEDURE — G0103 PSA SCREENING: HCPCS | Performed by: INTERNAL MEDICINE

## 2025-04-09 PROCEDURE — 99214 OFFICE O/P EST MOD 30 MIN: CPT | Mod: 25 | Performed by: INTERNAL MEDICINE

## 2025-04-09 PROCEDURE — 3077F SYST BP >= 140 MM HG: CPT | Performed by: INTERNAL MEDICINE

## 2025-04-09 PROCEDURE — 80053 COMPREHEN METABOLIC PANEL: CPT | Performed by: INTERNAL MEDICINE

## 2025-04-09 PROCEDURE — 36415 COLL VENOUS BLD VENIPUNCTURE: CPT | Performed by: INTERNAL MEDICINE

## 2025-04-09 PROCEDURE — 86765 RUBEOLA ANTIBODY: CPT | Performed by: INTERNAL MEDICINE

## 2025-04-09 PROCEDURE — 80061 LIPID PANEL: CPT | Performed by: INTERNAL MEDICINE

## 2025-04-09 PROCEDURE — 90677 PCV20 VACCINE IM: CPT | Performed by: INTERNAL MEDICINE

## 2025-04-09 PROCEDURE — 99396 PREV VISIT EST AGE 40-64: CPT | Mod: 25 | Performed by: INTERNAL MEDICINE

## 2025-04-09 PROCEDURE — G2211 COMPLEX E/M VISIT ADD ON: HCPCS | Performed by: INTERNAL MEDICINE

## 2025-04-09 PROCEDURE — 81003 URINALYSIS AUTO W/O SCOPE: CPT | Performed by: INTERNAL MEDICINE

## 2025-04-09 RX ORDER — LISINOPRIL AND HYDROCHLOROTHIAZIDE 12.5; 2 MG/1; MG/1
2 TABLET ORAL DAILY
Qty: 180 TABLET | Refills: 3 | Status: SHIPPED | OUTPATIENT
Start: 2025-04-09 | End: 2026-04-04

## 2025-04-09 SDOH — HEALTH STABILITY: PHYSICAL HEALTH: ON AVERAGE, HOW MANY MINUTES DO YOU ENGAGE IN EXERCISE AT THIS LEVEL?: 60 MIN

## 2025-04-09 SDOH — HEALTH STABILITY: PHYSICAL HEALTH: ON AVERAGE, HOW MANY DAYS PER WEEK DO YOU ENGAGE IN MODERATE TO STRENUOUS EXERCISE (LIKE A BRISK WALK)?: 5 DAYS

## 2025-04-09 ASSESSMENT — PAIN SCALES - GENERAL: PAINLEVEL_OUTOF10: NO PAIN (0)

## 2025-04-09 ASSESSMENT — SOCIAL DETERMINANTS OF HEALTH (SDOH): HOW OFTEN DO YOU GET TOGETHER WITH FRIENDS OR RELATIVES?: MORE THAN THREE TIMES A WEEK

## 2025-04-09 NOTE — PATIENT INSTRUCTIONS
Patient Education   Prostate Cancer Screening: Care Instructions  Overview     Prostate cancer is the abnormal growth of cells in the prostate. The prostate is part of the male reproductive system. It is a small organ below the bladder that makes fluid for semen.  Screening can help find prostate cancer early. When it's found and treated early, the cancer may be cured. But it's not always treated. That's because the treatments can cause serious side effects. In most cases, prostate cancer isn't life-threatening. This is especially true in someone who is older and when the cancer grows slowly.  Prostate cancer is a common type of cancer. Most cases occur after age 65. The disease runs in families. And it's more common in  Americans.  Follow-up care is a key part of your treatment and safety. Be sure to make and go to all appointments, and call your doctor if you are having problems. It's also a good idea to know your test results and keep a list of the medicines you take.  What is the screening test for prostate cancer?  The main screening test for prostate cancer is the prostate-specific antigen (PSA) test. This is a blood test that measures how much PSA is in your blood. A high level may mean that you have an enlarged prostate, an infection, or cancer.  Along with the PSA test, you may have a digital (finger) rectal exam. This exam checks for anything abnormal in your prostate. To do the exam, the doctor puts a lubricated, gloved finger into your rectum.  If these tests suggest cancer, you may need a prostate biopsy to see if there are cancer cells in the prostate.  What are the pros and cons of screening?  Neither a PSA test nor a digital rectal exam can tell you for sure that you do or do not have cancer. But they can help you decide if you need more tests, such as a prostate biopsy. Screening tests may be useful because prostate cancer often doesn't cause symptoms. It can be hard to know if you have  "cancer until it's more advanced. And then it's harder to treat.  But having a PSA test can also cause harm. The test may show high levels of PSA that aren't caused by cancer. So you could have a prostate biopsy you didn't need. Or the PSA test might be normal when there is cancer, so a cancer might not be found early. The test can also find cancers that would never have caused a problem during your lifetime. So you might have treatment that wasn't needed.  Prostate cancer usually develops late in life and grows slowly. In most cases, it doesn't shorten lives.  Talk with your doctor to see if screening is right for you.  Where can you learn more?  Go to https://www.[employer].net/patiented  Enter R550 in the search box to learn more about \"Prostate Cancer Screening: Care Instructions.\"  Current as of: October 25, 2024  Content Version: 14.4    7541-3250 [employer].   Care instructions adapted under license by your healthcare professional. If you have questions about a medical condition or this instruction, always ask your healthcare professional. [employer] disclaims any warranty or liability for your use of this information.    Preventive Care Advice   This is general advice given by our system to help you stay healthy. However, your care team may have specific advice just for you. Please talk to your care team about your preventive care needs.  Nutrition  Eat 5 or more servings of fruits and vegetables each day.  Try wheat bread, brown rice and whole grain pasta (instead of white bread, rice, and pasta).  Get enough calcium and vitamin D. Check the label on foods and aim for 100% of the RDA (recommended daily allowance).  Lifestyle  Exercise at least 150 minutes each week  (30 minutes a day, 5 days a week).  Do muscle strengthening activities 2 days a week. These help control your weight and prevent disease.  No smoking.  Wear sunscreen to prevent skin cancer.  Have a dental exam and " cleaning every 6 months.  Yearly exams  See your health care team every year to talk about:  Any changes in your health.  Any medicines your care team has prescribed.  Preventive care, family planning, and ways to prevent chronic diseases.  Shots (vaccines)   HPV shots (up to age 26), if you've never had them before.  Hepatitis B shots (up to age 59), if you've never had them before.  COVID-19 shot: Get this shot when it's due.  Flu shot: Get a flu shot every year.  Tetanus shot: Get a tetanus shot every 10 years.  Pneumococcal, hepatitis A, and RSV shots: Ask your care team if you need these based on your risk.  Shingles shot (for age 50 and up)  General health tests  Diabetes screening:  Starting at age 35, Get screened for diabetes at least every 3 years.  If you are younger than age 35, ask your care team if you should be screened for diabetes.  Cholesterol test: At age 39, start having a cholesterol test every 5 years, or more often if advised.  Bone density scan (DEXA): At age 50, ask your care team if you should have this scan for osteoporosis (brittle bones).  Hepatitis C: Get tested at least once in your life.  STIs (sexually transmitted infections)  Before age 24: Ask your care team if you should be screened for STIs.  After age 24: Get screened for STIs if you're at risk. You are at risk for STIs (including HIV) if:  You are sexually active with more than one person.  You don't use condoms every time.  You or a partner was diagnosed with a sexually transmitted infection.  If you are at risk for HIV, ask about PrEP medicine to prevent HIV.  Get tested for HIV at least once in your life, whether you are at risk for HIV or not.  Cancer screening tests  Cervical cancer screening: If you have a cervix, begin getting regular cervical cancer screening tests starting at age 21.  Breast cancer scan (mammogram): If you've ever had breasts, begin having regular mammograms starting at age 40. This is a scan to check  for breast cancer.  Colon cancer screening: It is important to start screening for colon cancer at age 45.  Have a colonoscopy test every 10 years (or more often if you're at risk) Or, ask your provider about stool tests like a FIT test every year or Cologuard test every 3 years.  To learn more about your testing options, visit:   .  For help making a decision, visit:   https://bit.ly/ba81494.  Prostate cancer screening test: If you have a prostate, ask your care team if a prostate cancer screening test (PSA) at age 55 is right for you.  Lung cancer screening: If you are a current or former smoker ages 50 to 80, ask your care team if ongoing lung cancer screenings are right for you.  For informational purposes only. Not to replace the advice of your health care provider. Copyright   2023 [facility] [employer]. All rights reserved. Clinically reviewed by the [facility] Program. [employer] [id number] - REV 01/24.  Eating Healthy Foods: Care Instructions  With every meal, you can make healthy food choices. Try to eat a variety of fruits, vegetables, whole grains, lean proteins, and low-fat dairy products. This can help you get the right balance of nutrients, including vitamins and minerals. Small changes add up over time. You can start by adding one healthy food to your meals each day.    Try to make half your plate fruits and vegetables, one-fourth whole grains, and one-fourth lean proteins. Try including dairy with your meals.   Eat more fruits and vegetables. Try to have them with most meals and snacks.   Foods for healthy eating        Fruits   These can be fresh, frozen, canned, or dried.  Try to choose whole fruit rather than fruit juice.  Eat a variety of colors.        Vegetables   These can be fresh, frozen, canned, or dried.  Beans, peas, and lentils count too.        Whole grains   Choose whole-grain breads, cereals, and noodles.  Try brown rice.        Lean proteins   These can include  "lean meat, poultry, fish, and eggs.  You can also have tofu, beans, peas, lentils, nuts, and seeds.        Dairy   Try milk, yogurt, and cheese.  Choose low-fat or fat-free when you can.  If you need to, use lactose-free milk or fortified plant-based milk products, such as soy milk.        Water   Drink water when you're thirsty.  Limit sugar-sweetened drinks, including soda, fruit drinks, and sports drinks.  Where can you learn more?  Go to https://www.[employer].net/patiented  Enter T756 in the search box to learn more about \"Eating Healthy Foods: Care Instructions.\"  Current as of: October 7, 2024  Content Version: 14.4 2024-2025 [employer].   Care instructions adapted under license by your healthcare professional. If you have questions about a medical condition or this instruction, always ask your healthcare professional. [employer] disclaims any warranty or liability for your use of this information.    9 Ways to Cut Back on Drinking  Maybe you've found yourself drinking more alcohol than you'd prefer. If you want to cut back, here are some ideas to try.    Think before you drink.  Do you really want a drink, or is it just a habit? If you're used to having a drink at a certain time, try doing something else then.     Look for substitutes.  Find some no-alcohol drinks that you enjoy, like flavored seltzer water, tea with honey, or tonic with a slice of lime. Or try alcohol-free beer or \"virgin\" cocktails (without the alcohol).     Drink more water.  Use water to quench your thirst. Drink a glass of water before you have any alcohol. Have another glass along with every drink or between drinks.     Shrink your drink.  For example, have a bottle of beer instead of a pint. Use a smaller glass for wine. Choose drinks with lower alcohol content (ABV%). Or use less liquor and more mixer in cocktails.     Slow down.  It's easy to drink quickly and without thinking about it. Pay attention, and " "make each drink last longer.     Do the math.  Total up how much you spend on alcohol each month. How much is that a year? If you cut back, what could you do with the money you save?     Take a break.  Choose a day or two each week when you won't drink at all. Notice how you feel on those days, physically and emotionally. How did you sleep? Do you feel better? Over time, add more break days.     Count calories.  Would you like to lose some weight? For some people that's a good motivator for cutting back. Figure out how many calories are in each drink. How many does that add up to in a day? In a week? In a month?     Practice saying no.  Be ready when someone offers you a drink. Try: \"Thanks, I've had enough.\" Or \"Thanks, but I'm cutting back.\" Or \"No, thanks. I feel better when I drink less.\"   Current as of: August 20, 2024  Content Version: 14.4    8741-5521 [employer].   Care instructions adapted under license by your healthcare professional. If you have questions about a medical condition or this instruction, always ask your healthcare professional. [employer] disclaims any warranty or liability for your use of this information.     "

## 2025-04-09 NOTE — PROGRESS NOTES
Preventive Care Visit  [facility]  STEVEN MCKEE MD, Internal Medicine  Apr 9, 2025      Assessment & Plan         1. Routine general medical examination at a health care facility (Primary)  We discussed health maintenance today.  Prevnar today.  We discussed shingles vaccination.  He will get that at this pharmacy.  Will check his measles immunity given the current outbreak.  Labs as below.  - Rubeola Antibody IgG; Future  - Comprehensive metabolic panel (BMP + Alb, Alk Phos, ALT, AST, Total. Bili, TP); Future  - UA Macroscopic with reflex to Microscopic and Culture - Lab Collect; Future  - Lipid panel reflex to direct LDL Fasting; Future    2. Primary hypertension  Blood pressure uncontrolled.  Increase lisinopril hydrochlorothiazide to 40/25 daily.  He will take 2 tablets of the 20/12.5 pills.  We discussed potential side effects that can occur with this and he will return in 2 weeks for blood work and a blood pressure check.  - Comprehensive metabolic panel (BMP + Alb, Alk Phos, ALT, AST, Total. Bili, TP); Future  - UA Macroscopic with reflex to Microscopic and Culture - Lab Collect; Future    3. Screening PSA (prostate specific antigen)    - Prostate Specific Antigen Screen; Future    4. History of rheumatoid arthritis--possibly--diagnosis in question  Likely never did have RA it sounds like.  He has never had symptoms since being off medications.  If he would have symptoms would have him have a second opinion.        Counseling  Appropriate preventive services were addressed with this patient via screening, questionnaire, or discussion as appropriate for fall prevention, nutrition, physical activity, Tobacco-use cessation, social engagement, weight loss and cognition.  Checklist reviewing preventive services available has been given to the patient.  Reviewed patient's diet, addressing concerns and/or questions.   The patient reports drinking more than 3 alcoholic drinks per day and/or  more than 7 drhnks per week. The patient was counseled and given information about possible harmful effects of excessive alcohol intake.        Subjective   Fletcher is a 59 year old, presenting for the following:  Physical (Pt reports they are here for annual visit. Question for BP - been on the same dose of BP meds for a long time. Has a record of BP readings from home. )        4/9/2025     3:18 PM   Additional Questions   Roomed by Ren   Accompanied by alone         4/9/2025     3:18 PM   Patient Reported Additional Medications   Patient reports taking the following new medications none          HPI     Fletcher comes in today for follow-up and for physical.  I have not seen Fletcher in over a year.  Pleasant gentleman with a history of hypertension.  He was diagnosed with RA by Dr. Garcia many years ago however that diagnosis is in question.  He is off of all immunosuppressive agents and anti-inflammatories and feels quite good.  No symptoms of RA.  His blood pressure has been running high however.  He has been checking it the last couple weeks and it has been running 140s or higher.  He feels well.  Offers no adverse side effects or issues at all.  He stays very active with exercise and golf.  Kids are doing well grandkids are doing well.  Son is still in [place] and plans to stay there.  Pt himself continues to work.  He and his wife are going on a [employer] cruise in the near future and looking forward to that.      Advance Care Planning  Patient does not have a Health Care Directive: Patient states has Advance Directive and will bring in a copy to clinic.      4/9/2025   General Health   How would you rate your overall physical health? Excellent   Feel stress (tense, anxious, or unable to sleep) Not at all         4/9/2025   Nutrition   Three or more servings of calcium each day? Yes   Diet: Regular (no restrictions)   How many servings of fruit and vegetables per day? (!) 2-3   How many sweetened beverages  each day? (!) 4+         4/9/2025   Exercise   Days per week of moderate/strenous exercise 5 days   Average minutes spent exercising at this level 60 min         4/9/2025   Social Factors   Frequency of gathering with friends or relatives More than three times a week   Worry food won't last until get money to buy more No   Food not last or not have enough money for food? No   Do you have housing? (Housing is defined as stable permanent housing and does not include staying ouside in a car, in a tent, in an abandoned building, in an overnight shelter, or couch-surfing.) Yes   Are you worried about losing your housing? No   Lack of transportation? No   Unable to get utilities (heat,electricity)? No         4/9/2025   Fall Risk   Fallen 2 or more times in the past year? No   Trouble with walking or balance? No          4/9/2025   Dental   Dentist two times every year? Yes           Today's PHQ-2 Score:       4/9/2025     2:57 PM   PHQ-2 ( 1999 [employer])   Q1: Little interest or pleasure in doing things 0   Q2: Feeling down, depressed or hopeless 0   PHQ-2 Score 0    Q1: Little interest or pleasure in doing things Not at all   Q2: Feeling down, depressed or hopeless Not at all   PHQ-2 Score 0       Patient-reported           4/9/2025   Substance Use   Alcohol more than 3/day or more than 7/wk Yes   How often do you have a drink containing alcohol 4 or more times a week   How many alcohol drinks on typical day 1 or 2   How often do you have 5+ drinks at one occasion Monthly   Audit 2/3 Score 2   How often not able to stop drinking once started Never   How often failed to do what normally expected Never   How often needed first drink in am after a heavy drinking session Never   How often feeling of guilt or remorse after drinking Never   How often unable to remember what happened the night before Never   Have you or someone else been injured because of your drinking No   Has anyone been concerned or suggested you cut down on  drinking No   TOTAL SCORE - AUDIT 6   Do you use any other substances recreationally? No     Social History     Tobacco Use    Smoking status: Never    Smokeless tobacco: Never   Substance Use Topics    Alcohol use: Not Currently    Drug use: Not Currently           4/9/2025   STI Screening   New sexual partner(s) since last STI/HIV test? No   Last PSA:   Prostate Specific Antigen Screen   Date Value Ref Range Status   12/21/2023 1.26 0.00 - 4.50 ng/mL Final   12/09/2021 1.15 0.00 - 3.50 ug/L Final     ASCVD Risk   The ASCVD Risk score (Azalia ALONSO, et al., 2019) failed to calculate for the following reasons:    The valid HDL cholesterol range is 20 to 100 mg/dL           Reviewed and updated as needed this visit by Provider                          Review of Systems  Constitutional, HEENT, cardiovascular, pulmonary, gi and gu systems are negative, except as otherwise noted.     Objective    Exam  BP (!) 156/85 (BP Location: Left arm, Patient Position: Sitting, Cuff Size: Adult Regular)   Pulse 60   Temp 98.4  F (36.9  C) (Temporal)   Resp 19   Ht 1.829 m (6')   Wt 81.4 kg (179 lb 6.4 oz)   SpO2 99%   BMI 24.33 kg/m     Estimated body mass index is 24.33 kg/m  as calculated from the following:    Height as of this encounter: 1.829 m (6').    Weight as of this encounter: 81.4 kg (179 lb 6.4 oz).    Physical Exam  GENERAL: alert and no distress  EYES: Eyes grossly normal to inspection, PERRL and conjunctivae and sclerae normal  HENT: ear canals and TM's normal, nose and mouth without ulcers or lesions  NECK: no adenopathy, no asymmetry, masses, or scars  RESP: lungs clear to auscultation - no rales, rhonchi or wheezes  CV: regular rate and rhythm, normal S1 S2, no S3 or S4, no murmur, click or rub, no peripheral edema  ABDOMEN: soft, nontender, no hepatosplenomegaly, no masses and bowel sounds normal  MS: no gross musculoskeletal defects noted, no edema  SKIN: no suspicious lesions or rashes  NEURO:  Normal strength and tone, mentation intact and speech normal  PSYCH: mentation appears normal, affect normal/bright        Signed Electronically by: STEVEN MCKEE MD

## 2025-04-09 NOTE — NURSING NOTE
Prior to immunization administration, verified patients identity using patient s name and date of birth. Please see Immunization Activity for additional information.     Screening Questionnaire for Adult Immunization    Are you sick today?   No   Do you have allergies to medications, food, a vaccine component or latex?   No   Have you ever had a serious reaction after receiving a vaccination?   No   Do you have a long-term health problem with heart, lung, kidney, or metabolic disease (e.g., diabetes), asthma, a blood disorder, no spleen, complement component deficiency, a cochlear implant, or a spinal fluid leak?  Are you on long-term aspirin therapy?   No   Do you have cancer, leukemia, HIV/AIDS, or any other immune system problem?   No   Do you have a parent, brother, or sister with an immune system problem?   No   In the past 3 months, have you taken medications that affect  your immune system, such as prednisone, other steroids, or anticancer drugs; drugs for the treatment of rheumatoid arthritis, Crohn s disease, or psoriasis; or have you had radiation treatments?   No   Have you had a seizure, or a brain or other nervous system problem?   No   During the past year, have you received a transfusion of blood or blood    products, or been given immune (gamma) globulin or antiviral drug?   No   For women: Are you pregnant or is there a chance you could become       pregnant during the next month?   N/A   Have you received any vaccinations in the past 4 weeks?   No     Immunization questionnaire answers were all negative.      Patient instructed to remain in clinic for 15 minutes afterwards, and to report any adverse reactions.     Screening performed by Sury Mace RN on 4/9/2025 at 4:15 PM.

## 2025-04-10 LAB
ALBUMIN SERPL BCG-MCNC: 4.7 G/DL (ref 3.5–5.2)
ALP SERPL-CCNC: 77 U/L (ref 40–150)
ALT SERPL W P-5'-P-CCNC: 23 U/L (ref 0–70)
ANION GAP SERPL CALCULATED.3IONS-SCNC: 12 MMOL/L (ref 7–15)
AST SERPL W P-5'-P-CCNC: 30 U/L (ref 0–45)
BILIRUB SERPL-MCNC: 1.1 MG/DL
BUN SERPL-MCNC: 11 MG/DL (ref 8–23)
CALCIUM SERPL-MCNC: 9.7 MG/DL (ref 8.8–10.4)
CHLORIDE SERPL-SCNC: 97 MMOL/L (ref 98–107)
CHOLEST SERPL-MCNC: 224 MG/DL
CREAT SERPL-MCNC: 0.98 MG/DL (ref 0.67–1.17)
EGFRCR SERPLBLD CKD-EPI 2021: 88 ML/MIN/1.73M2
FASTING STATUS PATIENT QL REPORTED: YES
FASTING STATUS PATIENT QL REPORTED: YES
GLUCOSE SERPL-MCNC: 97 MG/DL (ref 70–99)
HCO3 SERPL-SCNC: 28 MMOL/L (ref 22–29)
HDLC SERPL-MCNC: 136 MG/DL
LDLC SERPL CALC-MCNC: 79 MG/DL
MEV IGG SER IA-ACNC: >300 AU/ML
MEV IGG SER IA-ACNC: POSITIVE
NONHDLC SERPL-MCNC: 88 MG/DL
POTASSIUM SERPL-SCNC: 4.6 MMOL/L (ref 3.4–5.3)
PROT SERPL-MCNC: 7.2 G/DL (ref 6.4–8.3)
PSA SERPL DL<=0.01 NG/ML-MCNC: 1.53 NG/ML (ref 0–4.5)
SODIUM SERPL-SCNC: 137 MMOL/L (ref 135–145)
TRIGL SERPL-MCNC: 43 MG/DL

## 2025-04-20 DIAGNOSIS — N52.9 ERECTILE DYSFUNCTION, UNSPECIFIED ERECTILE DYSFUNCTION TYPE: ICD-10-CM

## 2025-04-21 RX ORDER — SILDENAFIL 100 MG/1
TABLET, FILM COATED ORAL
Qty: 30 TABLET | Refills: 0 | Status: SHIPPED | OUTPATIENT
Start: 2025-04-21

## 2025-04-23 ENCOUNTER — ALLIED HEALTH/NURSE VISIT (OUTPATIENT)
Dept: FAMILY MEDICINE | Facility: CLINIC | Age: 60
End: 2025-04-23
Payer: COMMERCIAL

## 2025-04-23 ENCOUNTER — LAB (OUTPATIENT)
Dept: LAB | Facility: CLINIC | Age: 60
End: 2025-04-23
Payer: COMMERCIAL

## 2025-04-23 VITALS — DIASTOLIC BLOOD PRESSURE: 80 MMHG | SYSTOLIC BLOOD PRESSURE: 142 MMHG | HEART RATE: 60 BPM

## 2025-04-23 DIAGNOSIS — I10 PRIMARY HYPERTENSION: Primary | ICD-10-CM

## 2025-04-23 DIAGNOSIS — I10 PRIMARY HYPERTENSION: ICD-10-CM

## 2025-04-23 LAB
ANION GAP SERPL CALCULATED.3IONS-SCNC: 10 MMOL/L (ref 7–15)
BUN SERPL-MCNC: 10.8 MG/DL (ref 8–23)
CALCIUM SERPL-MCNC: 9.1 MG/DL (ref 8.8–10.4)
CHLORIDE SERPL-SCNC: 93 MMOL/L (ref 98–107)
CREAT SERPL-MCNC: 0.94 MG/DL (ref 0.67–1.17)
EGFRCR SERPLBLD CKD-EPI 2021: >90 ML/MIN/1.73M2
GLUCOSE SERPL-MCNC: 108 MG/DL (ref 70–99)
HCO3 SERPL-SCNC: 28 MMOL/L (ref 22–29)
POTASSIUM SERPL-SCNC: 4 MMOL/L (ref 3.4–5.3)
SODIUM SERPL-SCNC: 131 MMOL/L (ref 135–145)

## 2025-04-23 PROCEDURE — 99207 PR NO CHARGE NURSE ONLY: CPT

## 2025-04-23 PROCEDURE — 3077F SYST BP >= 140 MM HG: CPT

## 2025-04-23 PROCEDURE — 36415 COLL VENOUS BLD VENIPUNCTURE: CPT

## 2025-04-23 PROCEDURE — 3079F DIAST BP 80-89 MM HG: CPT

## 2025-04-23 PROCEDURE — 80048 BASIC METABOLIC PNL TOTAL CA: CPT

## 2025-04-23 NOTE — PROGRESS NOTES
Mao Larson is a 59 year old year old patient who comes in today for a Blood Pressure check because of ongoing blood pressure monitoring.    Vital Signs as repeated by RN   BP Readings from Last 3 Encounters:   04/23/25 (!) 142/80   04/09/25 (!) 156/85   02/03/25 114/60      Patient is taking medication as prescribed  Patient is tolerating medications well.    Patient is monitoring Blood Pressure at home.    Average readings if yes are mid 120's/mid 70's     Current complaints: none  Disposition:  huddled with provider - forwarded encounter to PCP to review     Casey Mason, Clinic RN  [facility]

## 2025-04-29 ENCOUNTER — TELEPHONE (OUTPATIENT)
Dept: INTERNAL MEDICINE | Facility: CLINIC | Age: 60
End: 2025-04-29
Payer: COMMERCIAL

## 2025-04-29 NOTE — TELEPHONE ENCOUNTER
Patient returning call regarding lab results:     Star Rodriguez MD  4/24/2025  2:43 PM CDT       Fletcher, your sodium level is a little low.  This may be due to the increase of the hydrochlorothiazide.  It could be that this is just a fluke however.  I would like to repeat the lab work in 1 week.  If the sodium is still low at that time I will probably take you off of the hydrochlorothiazide and put you on something else in its place.  Please schedule a lab appointment at a convenient location.       Relayed recommendation to patient. Patient unable to schedule lab only at time of call. Will schedule via mychart or return call. Also reinforced 4/23/25 telephone encounter recommendation of scheduling 1 month BP follow up with home machine with PCP. Patient verbalized understanding and had no further questions.

## 2025-05-08 ENCOUNTER — TELEPHONE (OUTPATIENT)
Dept: INTERNAL MEDICINE | Facility: CLINIC | Age: 60
End: 2025-05-08
Payer: COMMERCIAL

## 2025-05-08 NOTE — TELEPHONE ENCOUNTER
Pt has appt made on 5/9 at the clinic in [place]      ----- Message from STEVEN MCKEE sent at 5/8/2025  2:31 PM CDT -----  Please remind patient I need him to come back in for lab work soon.  Please schedule something for next week.  Thank you.

## 2025-05-12 ENCOUNTER — RESULTS FOLLOW-UP (OUTPATIENT)
Dept: INTERNAL MEDICINE | Facility: CLINIC | Age: 60
End: 2025-05-12

## 2025-05-13 ENCOUNTER — TELEPHONE (OUTPATIENT)
Dept: INTERNAL MEDICINE | Facility: CLINIC | Age: 60
End: 2025-05-13
Payer: COMMERCIAL

## 2025-05-14 NOTE — TELEPHONE ENCOUNTER
Pt called back and message below was relayed. Pt verbalized understanding and stated will call clinic back to schedule BP recheck at his convenience.